# Patient Record
Sex: FEMALE | Race: WHITE | NOT HISPANIC OR LATINO | Employment: OTHER | URBAN - METROPOLITAN AREA
[De-identification: names, ages, dates, MRNs, and addresses within clinical notes are randomized per-mention and may not be internally consistent; named-entity substitution may affect disease eponyms.]

---

## 2017-08-07 ENCOUNTER — OFFICE VISIT (OUTPATIENT)
Dept: PEDIATRICS | Facility: CLINIC | Age: 67
End: 2017-08-07
Payer: COMMERCIAL

## 2017-08-07 ENCOUNTER — TELEPHONE (OUTPATIENT)
Dept: PEDIATRICS | Facility: CLINIC | Age: 67
End: 2017-08-07

## 2017-08-07 VITALS
BODY MASS INDEX: 51.91 KG/M2 | WEIGHT: 293 LBS | HEART RATE: 87 BPM | DIASTOLIC BLOOD PRESSURE: 84 MMHG | TEMPERATURE: 98.7 F | SYSTOLIC BLOOD PRESSURE: 136 MMHG | HEIGHT: 63 IN

## 2017-08-07 DIAGNOSIS — Z12.11 SCREEN FOR COLON CANCER: ICD-10-CM

## 2017-08-07 DIAGNOSIS — I10 ESSENTIAL HYPERTENSION, BENIGN: Primary | ICD-10-CM

## 2017-08-07 DIAGNOSIS — R60.0 LOCALIZED EDEMA: ICD-10-CM

## 2017-08-07 DIAGNOSIS — R01.1 SYSTOLIC MURMUR: ICD-10-CM

## 2017-08-07 DIAGNOSIS — Z23 NEED FOR PROPHYLACTIC VACCINATION WITH TETANUS-DIPHTHERIA (TD): ICD-10-CM

## 2017-08-07 DIAGNOSIS — Z12.31 VISIT FOR SCREENING MAMMOGRAM: ICD-10-CM

## 2017-08-07 PROCEDURE — 99214 OFFICE O/P EST MOD 30 MIN: CPT | Performed by: INTERNAL MEDICINE

## 2017-08-07 PROCEDURE — 90714 TD VACC NO PRESV 7 YRS+ IM: CPT | Performed by: INTERNAL MEDICINE

## 2017-08-07 RX ORDER — HYDROCHLOROTHIAZIDE 25 MG/1
25 TABLET ORAL DAILY
Qty: 90 TABLET | Refills: 3 | Status: SHIPPED | OUTPATIENT
Start: 2017-08-07 | End: 2018-08-17

## 2017-08-07 RX ORDER — LISINOPRIL 10 MG/1
10 TABLET ORAL DAILY
Qty: 90 TABLET | Refills: 3 | Status: SHIPPED | OUTPATIENT
Start: 2017-08-07 | End: 2018-08-17

## 2017-08-07 NOTE — NURSING NOTE
"Chief Complaint   Patient presents with     Recheck Medication       Initial BP (!) 142/92 (Cuff Size: Adult Large)  Pulse 87  Temp 98.7  F (37.1  C) (Oral)  Ht 5' 2.5\" (1.588 m)  Wt (!) 324 lb (147 kg)  BMI 58.32 kg/m2 Estimated body mass index is 58.32 kg/(m^2) as calculated from the following:    Height as of this encounter: 5' 2.5\" (1.588 m).    Weight as of this encounter: 324 lb (147 kg).  Medication Reconciliation: complete    Marquita Foss   "

## 2017-08-07 NOTE — TELEPHONE ENCOUNTER
Panel Management Review      Patient has the following on her problem list:     Hypertension   Last three blood pressure readings:  BP Readings from Last 3 Encounters:   08/07/17 (!) 142/92   09/13/16 132/70   08/21/15 126/80     Blood pressure: FAILED    HTN Guidelines:  Age 18-59 BP range:  Less than 140/90  Age 60-85 with Diabetes:  Less than 140/90  Age 60-85 without Diabetes:  less than 150/90        Composite cancer screening  Chart review shows that this patient is due/due soon for the following Mammogram and Colonoscopy  Summary:    Patient is due/failing the following:   COLONOSCOPY and MAMMOGRAM    Action needed:   Pt needs to schedule mammo and colonoscopy. Orders in.     Type of outreach:    Spoke with pt at 8/7 appt. Pt will call to schedule both mammo and colonoscopy.     Questions for provider review:    None                                                                                                                                    PANKAJ Reyes August 7, 2017 9:26 AM        Chart routed to Closed .

## 2017-08-07 NOTE — PROGRESS NOTES
"  SUBJECTIVE:                                                    Lilliana Manning is a 66 year old female who presents to clinic today for the following health issues:      Followup of HTN.     Taking Medication as prescribed: NO-Has not taking hydrochlorothiazide or lisinopril since Friday 8/4    Side Effects:  None    Medication Helping Symptoms:  yes     She stopped taking her HTN medications as she wondered if she could stop taking them.  No cardiac sx such as CP, palpitations, PND, orthopnea, NGO or change in her chronic peripheral edema.   Was having some fatigue during the daytime, no significant change since stopping the medications.    BP is high normal today.   Advised restarting meds.    Has a systolic murmur.  Echocardiogram was done last September.  Has mild aortic sclerosis, no other significant abnormalities noted.    Reviewed HM.   Due for several preventative care items.  Colonoscopy - referral placed  Mammogram - order placed  Tetanus booster - given during OV    Problem list and histories reviewed & adjusted, as indicated.    Labs reviewed in EPIC    Reviewed and updated as needed this visit by clinical staffTobacco  Allergies  Meds  Med Hx  Surg Hx  Fam Hx  Soc Hx      Reviewed and updated as needed this visit by Provider  Tobacco  Allergies  Meds  Problems  Med Hx  Surg Hx  Fam Hx  Soc Hx          ROS:  Constitutional, HEENT, cardiovascular, pulmonary, gi and gu systems are negative, except as otherwise noted.      OBJECTIVE:   /84  Pulse 87  Temp 98.7  F (37.1  C) (Oral)  Ht 5' 2.5\" (1.588 m)  Wt (!) 324 lb (147 kg)  BMI 58.32 kg/m2  Body mass index is 58.32 kg/(m^2).  GEN: No distress  SKIN: No rashes  HEENT: PERRL. EOMI. No nasal d/c. OP moist.  NECK: Supple. No LAD or TM. No bruits.  LUNGS: Clear to auscultation bilaterally. No rhonchi, rales, wheezes or retractions.  CV: Regular rate and rhythm.  1/6 systolic murmur. No rubs or gallops. Pulses 2+ radial.  ABD: BS+. " S, ND.  EXTR: No significant LE edema.  PSYCH: Normal affect. Well groomed. Good eye contact.     ASSESSMENT/PLAN:       ICD-10-CM    1. BENIGN HYPERTENSION I10 lisinopril (PRINIVIL/ZESTRIL) 10 MG tablet     hydrochlorothiazide (HYDRODIURIL) 25 MG tablet   2. Localized edema R60.0 hydrochlorothiazide (HYDRODIURIL) 25 MG tablet   3. Systolic murmur R01.1    4. Screen for colon cancer Z12.11 GASTROENTEROLOGY ADULT REF PROCEDURE ONLY   5. Visit for screening mammogram Z12.31 MA SCREENING DIGITAL BILAT - Future  (s+30)   6. Need for prophylactic vaccination with tetanus-diphtheria (TD) Z23 TD PRESERV FREE >=7 YRS ADS IM     Continue w/ current medications (take daily).  Will work on dietary changes and weight loss to help bring BP lower into the normal range.  Monitor cardiac status and BP at home if possible.    Screenings ordered as above      Hugh Leon MD  [facility]

## 2017-08-07 NOTE — MR AVS SNAPSHOT
After Visit Summary   8/7/2017    Lilliana Manning    MRN: [id number]           Patient Information     Date Of Birth          1950        Visit Information        Provider Department      8/7/2017 9:20 AM Hugh Leon MD [facility]        Today's Diagnoses     BENIGN HYPERTENSION    -  1    Localized edema        Systolic murmur        Screen for colon cancer        Visit for screening mammogram        Need for prophylactic vaccination with tetanus-diphtheria (TD)           Follow-ups after your visit        Additional Services     GASTROENTEROLOGY ADULT REF PROCEDURE ONLY       Last Lab Result: Creatinine (mg/dL)       Date                     Value                 09/13/2016               0.84             ----------  Body mass index is 58.32 kg/(m^2).     Needed:  No  Language:  English    Patient will be contacted to schedule procedure.     Please be aware that coverage of these services is subject to the terms and limitations of your health insurance plan.  Call member services at your health plan with any benefit or coverage questions.  Any procedures must be performed at a [facility] facility OR coordinated by your clinic's referral office.    Please bring the following with you to your appointment:    (1) Any X-Rays, CTs or MRIs which have been performed.  Contact the facility where they were done to arrange for  prior to your scheduled appointment.    (2) List of current medications   (3) This referral request   (4) Any documents/labs given to you for this referral                  Future tests that were ordered for you today     Open Future Orders        Priority Expected Expires Ordered    MA SCREENING DIGITAL BILAT - Future  (s+30) Routine  8/7/2018 8/7/2017            Who to contact     If you have questions or need follow up information about today's clinic visit or your schedule please contact [facility] [place] directly at [phone].  Normal or  "non-critical lab and imaging results will be communicated to you by MyChart, letter or phone within 4 business days after the clinic has received the results. If you do not hear from us within 7 days, please contact the clinic through [employer] or phone. If you have a critical or abnormal lab result, we will notify you by phone as soon as possible.  Submit refill requests through [employer] or call your pharmacy and they will forward the refill request to us. Please allow 3 business days for your refill to be completed.          Additional Information About Your Visit        [employer] Information     [employer] gives you secure access to your electronic health record. If you see a primary care provider, you can also send messages to your care team and make appointments. If you have questions, please call your primary care clinic.  If you do not have a primary care provider, please call [phone] and they will assist you.        Care EveryWhere ID     This is your Care EveryWhere ID. This could be used by other organizations to access your [facility] medical records  [id number]        Your Vitals Were     Pulse Temperature Height BMI (Body Mass Index)          87 98.7  F (37.1  C) (Oral) 5' 2.5\" (1.588 m) 58.32 kg/m2         Blood Pressure from Last 3 Encounters:   08/07/17 136/84   09/13/16 132/70   08/21/15 126/80    Weight from Last 3 Encounters:   08/07/17 (!) 324 lb (147 kg)   09/13/16 (!) 304 lb (137.9 kg)   08/21/15 283 lb (128.4 kg)              We Performed the Following     GASTROENTEROLOGY ADULT REF PROCEDURE ONLY     TD PRESERV FREE >=7 YRS ADS IM          Where to get your medicines      These medications were sent to [facility]/pharmacy #[id number] - [place], [place] - [address] AT [facility] [place] [address], [place] [address]     Phone:  [phone]     hydrochlorothiazide 25 MG tablet    lisinopril 10 MG tablet          Primary Care Provider Office Phone # Fax #    Hguh Leon MD " [phone]       [facility] [address] [place] [address]        Equal Access to Services     MARIAMA WILSON : Hadii aad ku hadcaspermonroe Padmini, wascottda luqdaniel, qanajmata kaalmada swetha, eyad martinjoyce grace. So [facility] [phone].    ATENCIÓN: Si habla español, tiene a [place] disposición servicios gratuitos de asistencia lingüística. Llame al [phone].    We comply with applicable federal civil rights laws and [place] laws. We do not discriminate on the basis of race, color, national origin, age, disability sex, sexual orientation or gender identity.            Thank you!     Thank you [facility]  [facility] your care. Our goal is always to provide you with excellent care. Hearing back from our patients is one way we can continue to improve our services. Please take a few minutes to complete the written survey that you may receive in the mail after your visit with us. Thank you!             Your Updated Medication List - Protect others around you: Learn how to safely use, store and throw away your medicines at www.disposemymeds.org.          This list is accurate as of: 8/7/17  3:15 PM.  Always use your most recent med list.                   Brand Name Dispense Instructions for use Diagnosis    calcium-vitamin D 600-400 MG-UNIT per tablet    CALTRATE     Take 1 tablet by mouth 2 times daily        hydrochlorothiazide 25 MG tablet    HYDRODIURIL    90 tablet    Take 1 tablet (25 mg) by mouth daily    Essential hypertension, benign, Localized edema       lisinopril 10 MG tablet    PRINIVIL/ZESTRIL    90 tablet    Take 1 tablet (10 mg) by mouth daily    Essential hypertension, benign       Multi-vitamin Tabs tablet     100 tablet    Take 1 tablet by mouth daily

## 2017-08-16 ENCOUNTER — HOSPITAL ENCOUNTER (OUTPATIENT)
Facility: CLINIC | Age: 67
End: 2017-08-16
Attending: INTERNAL MEDICINE | Admitting: INTERNAL MEDICINE
Payer: MEDICARE

## 2017-09-15 ENCOUNTER — RADIANT APPOINTMENT (OUTPATIENT)
Dept: MAMMOGRAPHY | Facility: CLINIC | Age: 67
End: 2017-09-15
Attending: INTERNAL MEDICINE
Payer: COMMERCIAL

## 2017-09-15 DIAGNOSIS — Z12.31 VISIT FOR SCREENING MAMMOGRAM: ICD-10-CM

## 2017-09-15 PROCEDURE — G0202 SCR MAMMO BI INCL CAD: HCPCS | Mod: TC

## 2017-10-28 ENCOUNTER — HEALTH MAINTENANCE LETTER (OUTPATIENT)
Age: 67
End: 2017-10-28

## 2017-12-22 ENCOUNTER — TRANSFERRED RECORDS (OUTPATIENT)
Dept: HEALTH INFORMATION MANAGEMENT | Facility: CLINIC | Age: 67
End: 2017-12-22

## 2018-01-10 ENCOUNTER — TELEPHONE (OUTPATIENT)
Dept: PEDIATRICS | Facility: CLINIC | Age: 68
End: 2018-01-10

## 2018-01-10 NOTE — TELEPHONE ENCOUNTER
"1/10/2018      Patient Communication Preferences indicate  Do not contact  and/or communication by \"Phone\" is not preferred. Call not required per Outreach team.          Outreach ,  Debbie Ly    "

## 2018-08-17 DIAGNOSIS — R60.0 LOCALIZED EDEMA: ICD-10-CM

## 2018-08-17 DIAGNOSIS — I10 ESSENTIAL HYPERTENSION, BENIGN: ICD-10-CM

## 2018-08-17 NOTE — TELEPHONE ENCOUNTER
"Requested Prescriptions   Pending Prescriptions Disp Refills     lisinopril (PRINIVIL/ZESTRIL) 10 MG tablet [Pharmacy Med Name: LISINOPRIL 10 MG TABLET]    Last Written Prescription Date:  8/7/2017  Last Fill Quantity: 90,  # refills: 3   Last office visit: 8/7/2017 with prescribing provider:  Hugh Leon     Future Office Visit:     90 tablet 3     Sig: TAKE 1 TABLET (10 MG) BY MOUTH DAILY    ACE Inhibitors (Including Combos) Protocol Failed    8/17/2018  1:10 AM       Failed - Blood pressure under 140/90 in past 12 months    BP Readings from Last 3 Encounters:   08/07/17 136/84   09/13/16 132/70   08/21/15 126/80                Failed - Recent (12 mo) or future (30 days) visit within the authorizing provider's specialty    Patient had office visit in the last 12 months or has a visit in the next 30 days with authorizing provider or within the authorizing provider's specialty.  See \"Patient Info\" tab in inbasket, or \"Choose Columns\" in Meds & Orders section of the refill encounter.           Failed - Normal serum creatinine on file in past 12 months    Recent Labs   Lab Test  09/13/16   1039   CR  0.84            Failed - Normal serum potassium on file in past 12 months    Recent Labs   Lab Test  09/13/16   1039   POTASSIUM  4.1            Passed - Patient is age 18 or older       Passed - No active pregnancy on record       Passed - No positive pregnancy test in past 12 months        hydrochlorothiazide (HYDRODIURIL) 25 MG tablet [Pharmacy Med Name: HYDROCHLOROTHIAZIDE 25 MG TAB]    Last Written Prescription Date:  8/7/2017  Last Fill Quantity: 90,  # refills: 3   Last office visit: 8/7/2017 with prescribing provider:  Hugh Leon     Future Office Visit:     90 tablet 3     Sig: TAKE 1 TABLET (25 MG) BY MOUTH DAILY    Diuretics (Including Combos) Protocol Failed    8/17/2018  1:10 AM       Failed - Blood pressure under 140/90 in past 12 months    BP Readings from Last 3 Encounters:   08/07/17 136/84 " "  09/13/16 132/70   08/21/15 126/80                Failed - Recent (12 mo) or future (30 days) visit within the authorizing provider's specialty    Patient had office visit in the last 12 months or has a visit in the next 30 days with authorizing provider or within the authorizing provider's specialty.  See \"Patient Info\" tab in inbasket, or \"Choose Columns\" in Meds & Orders section of the refill encounter.           Failed - Normal serum creatinine on file in past 12 months    Recent Labs   Lab Test  09/13/16   1039   CR  0.84             Failed - Normal serum potassium on file in past 12 months    Recent Labs   Lab Test  09/13/16   1039   POTASSIUM  4.1                   Failed - Normal serum sodium on file in past 12 months    Recent Labs   Lab Test  09/13/16   1039   NA  141             Passed - Patient is age 18 or older       Passed - No active pregancy on record       Passed - No positive pregnancy test in past 12 months          "

## 2018-08-20 RX ORDER — LISINOPRIL 10 MG/1
TABLET ORAL
Qty: 90 TABLET | Refills: 0 | Status: SHIPPED | OUTPATIENT
Start: 2018-08-20 | End: 2018-11-07

## 2018-08-20 RX ORDER — HYDROCHLOROTHIAZIDE 25 MG/1
TABLET ORAL
Qty: 90 TABLET | Refills: 0 | Status: SHIPPED | OUTPATIENT
Start: 2018-08-20 | End: 2018-11-20 | Stop reason: DRUGHIGH

## 2018-11-07 ENCOUNTER — OFFICE VISIT (OUTPATIENT)
Dept: PEDIATRICS | Facility: CLINIC | Age: 68
End: 2018-11-07
Payer: COMMERCIAL

## 2018-11-07 VITALS
TEMPERATURE: 97.7 F | OXYGEN SATURATION: 96 % | DIASTOLIC BLOOD PRESSURE: 70 MMHG | HEART RATE: 101 BPM | WEIGHT: 293 LBS | BODY MASS INDEX: 53.92 KG/M2 | HEIGHT: 62 IN | SYSTOLIC BLOOD PRESSURE: 132 MMHG

## 2018-11-07 DIAGNOSIS — Z12.39 SCREENING FOR BREAST CANCER: ICD-10-CM

## 2018-11-07 DIAGNOSIS — R60.0 LOCALIZED EDEMA: ICD-10-CM

## 2018-11-07 DIAGNOSIS — L30.9 DERMATITIS: ICD-10-CM

## 2018-11-07 DIAGNOSIS — I10 ESSENTIAL HYPERTENSION, BENIGN: Primary | ICD-10-CM

## 2018-11-07 PROCEDURE — 99213 OFFICE O/P EST LOW 20 MIN: CPT | Performed by: INTERNAL MEDICINE

## 2018-11-07 RX ORDER — LISINOPRIL 10 MG/1
TABLET ORAL
Qty: 90 TABLET | Refills: 4 | Status: SHIPPED | OUTPATIENT
Start: 2018-11-07 | End: 2019-11-11

## 2018-11-07 RX ORDER — TRIAMCINOLONE ACETONIDE 1 MG/G
CREAM TOPICAL
Qty: 30 G | Refills: 0 | Status: SHIPPED | OUTPATIENT
Start: 2018-11-07 | End: 2018-11-28

## 2018-11-07 RX ORDER — HYDROCHLOROTHIAZIDE 25 MG/1
TABLET ORAL
Qty: 90 TABLET | Refills: 0 | Status: CANCELLED | OUTPATIENT
Start: 2018-11-07

## 2018-11-07 RX ORDER — HYDROCHLOROTHIAZIDE 50 MG/1
50 TABLET ORAL DAILY
Qty: 90 TABLET | Refills: 4 | Status: SHIPPED | OUTPATIENT
Start: 2018-11-07 | End: 2020-01-06

## 2018-11-07 NOTE — PATIENT INSTRUCTIONS
Increase Hydrochlorothiazide to 50 mg daily    Triamcinolone 0.1% cream to the 2 spots on your right arm twice per day for 2 weeks    Schedule a dermatology visit in case the spots don't improve

## 2018-11-07 NOTE — PROGRESS NOTES
"  SUBJECTIVE:   Lilliana Manning is a 68 year old female who presents to clinic today for the following health issues:    Hypertension Follow-up      Outpatient blood pressures are not being checked.    Low Salt Diet: no added salt    No cardiac sx such as CP, palpitations, PND, orthopnea, NGO.    Has chronic peripheral edema. Seems somewhat worse in the past 2 months, but has not bee as active.  Questions possibly increasing HCTZ dose.    2 skin lesions on the right forearm. Clear. One will sometimes flake off.      Amount of exercise or physical activity: none     Problems taking medications regularly: No    Medication side effects: none    Diet: regular (no restrictions), low salt and low fat/cholesterol    Problem list and histories reviewed & adjusted, as indicated.    Labs reviewed in EPIC        OBJECTIVE:     /70 (BP Location: Right arm, Patient Position: Chair, Cuff Size: Adult Large)  Pulse 101  Temp 97.7  F (36.5  C) (Tympanic)  Ht 5' 2.25\" (1.581 m)  Wt (!) 338 lb 9 oz (153.6 kg)  SpO2 96%  BMI 61.43 kg/m2  Body mass index is 61.43 kg/(m^2).  GEN: No distress  SKIN: 2 clear nevi on the right proximal forearm, larger approx 1 cm diameter, other 5-6 mm diameter. Slightly raised.   NECK: Supple. No LAD or TM.  LUNGS: Clear to auscultation bilaterally. No rhonchi, rales, wheezes or retractions.  CV: RRR. 1/6 LEVI loudest at the RUSB. Pulses 2+ vic radial.     ASSESSMENT/PLAN:       ICD-10-CM    1. BENIGN HYPERTENSION I10 lisinopril (PRINIVIL/ZESTRIL) 10 MG tablet     hydrochlorothiazide (HYDRODIURIL) 50 MG tablet   2. Localized edema R60.0 hydrochlorothiazide (HYDRODIURIL) 50 MG tablet   3. Screening for breast cancer Z12.31 *MA Screening Digital Bilateral   4. Dermatitis L30.9 triamcinolone (KENALOG) 0.1 % cream     DERMATOLOGY REFERRAL     HTN - controlled  Edema - somewhat worse. Increase HCTZ to 50 mg every day. Restart exercise regimen.  Derm changes - likely SK. Cannot r/o local irritation. " Trial of triamcinolone. Schedule derm appt in case does not improve.    Hugh Leon MD  [facility]

## 2018-11-07 NOTE — MR AVS SNAPSHOT
After Visit Summary   11/7/2018    Lilliana Manning    MRN: [id number]           Patient Information     Date Of Birth          1950        Visit Information        Provider Department      11/7/2018 10:00 AM Hugh Leon MD [facility]        Today's Diagnoses     BENIGN HYPERTENSION    -  1    Localized edema        Screening for breast cancer        Dermatitis          Care Instructions    Increase Hydrochlorothiazide to 50 mg daily    Triamcinolone 0.1% cream to the 2 spots on your right arm twice per day for 2 weeks    Schedule a dermatology visit in case the spots don't improve           Follow-ups after your visit        Additional Services     DERMATOLOGY REFERRAL       Your provider has referred you to:   [facility] [place] [phone]    [facility] [place] [phone]   [email or web address]    Please be aware that coverage of these services is subject to the terms and limitations of your health insurance plan.  Call member services at your health plan with any benefit or coverage questions.      Please bring the following with you to your appointment:    (1) Any X-Rays, CTs or MRIs which have been performed.  Contact the facility where they were done to arrange for  prior to your scheduled appointment.    (2) List of current medications  (3) This referral request   (4) Any documents/labs given to you for this referral                  Your next 10 appointments already scheduled     Nov 28, 2018  9:20 AM CST   PHYSICAL with Hugh Leon MD   [facility] ([facility]3])    [address] [place] [address]  [address]  [facility] [address] [phone]            Nov 28, 2018 10:15 AM CST   MA SCREENING DIGITAL BILATERAL with EAMA1   [facility] ([facility]3])    [address] [place] [address],[address]  [facility] [address] [phone]           How do I prepare for my  "exam? (Food and drink instructions) No Food and Drink Restrictions.  How do I prepare for my exam? (Other instructions) Do not use any powder, lotion or deodorant under your arms or on your breast. If you do, we will ask you to remove it before your exam.  What should I wear: Wear comfortable, two-piece clothing.  How long does the exam take: Most scans will take 15 minutes.  What should I bring: Bring any previous mammograms from other facilities or have them mailed to the breast center.  Do I need a :  No  is needed.  What do I need to tell my doctor: If you have any allergies, tell your care team.  What should I do after the exam: No restrictions, You may resume normal activities.  What is this test: This test is an x-ray of the breast to look for breast disease. The breast is pressed between two plates to flatten and spread the tissue. An X-ray is taken of the breast from different angles.  Who should I call with questions: If you have any questions, please call the Imaging Department where you will have your exam. Directions, parking instructions, and other information is available on our website, [email or web address].  Other information about my exam Three-dimensional (3D) mammograms are available at [place] locations in [facility], [facility], [facility], [place], [facility] and [place]. [facility] locations include [place] and the [facility] in [place].  Benefits of 3D mammograms include * Improved rate of cancer detection * Decreases your chance of having to go back for more tests, which means fewer: * \"False-positive\" results (This means that there is an abnormal area but it isn't cancer.) * Invasive testing procedures, such as a biopsy or surgery * Can provide clearer images of the breast if you have dense breast tissue.  *3D mammography is an optional exam that anyone can have with a 2D mammogram. It doesn't replace or take the place of a 2D " "mammogram. 2D mammograms remain an effective screening test for all women.  Not all insurance companies cover the cost of a 3D mammogram. Check with your insurance. Three-dimensional (3D) mammograms are available at [place] locations in [facility], [facility], and [place]. [facility] locations include [place] and [facility] in [place]. Benefits of 3D mammograms include: - Improved rate of cancer detection - Decreases your chance of having to go back for more tests, which means fewer: - \"False-positive\" results (This means that there is an abnormal area but it isn't cancer.) - Invasive testing procedures, such as a biopsy or surgery - Can provide clearer images of the breast if you have dense breast tissue. 3D mammography is an optional exam that anyone can have with a 2D mammogram. It doesn't replace or take the place of a 2D mammogram. 2D mammograms remain an effective screening test for all women.  Not all insurance companies cover the cost of a 3D mammogram. Check with your insurance.              Future tests that were ordered for you today     Open Future Orders        Priority Expected Expires Ordered    *MA Screening Digital Bilateral Routine  11/7/2019 11/7/2018            Who to contact     If you have questions or need follow up information about today's clinic visit or your schedule please contact [facility] directly at [phone].  Normal or non-critical lab and imaging results will be communicated to you by MyChart, letter or phone within 4 business days after the clinic has received the results. If you do not hear from us within 7 days, please contact the clinic through Paytellerhart or phone. If you have a critical or abnormal lab result, we will notify you by phone as soon as possible.  Submit refill requests through ShoutOmatic or call your pharmacy and they will forward the refill request to us. Please allow 3 business days " "for your refill to be completed.          Additional Information About Your Visit        Vistaarhart Information     H[employer] gives you secure access to your electronic health record. If you see a primary care provider, you can also send messages to your care team and make appointments. If you have questions, please call your primary care clinic.  If you do not have a primary care provider, please call [phone] and they will assist you.        Care EveryWhere ID     This is your Care EveryWhere ID. This could be used by other organizations to access your [place] medical records  [id number]        Your Vitals Were     Pulse Temperature Height Pulse Oximetry BMI (Body Mass Index)       101 97.7  F (36.5  C) (Tympanic) 5' 2.25\" (1.581 m) 96% 61.43 kg/m2        Blood Pressure from Last 3 Encounters:   11/07/18 132/70   08/07/17 136/84   09/13/16 132/70    Weight from Last 3 Encounters:   11/07/18 (!) 338 lb 9 oz (153.6 kg)   08/07/17 (!) 324 lb (147 kg)   09/13/16 (!) 304 lb (137.9 kg)              We Performed the Following     DERMATOLOGY REFERRAL          Today's Medication Changes          These changes are accurate as of 11/7/18 10:29 AM.  If you have any questions, ask your nurse or doctor.               Start taking these medicines.        Dose/Directions    triamcinolone 0.1 % cream   Commonly known as:  KENALOG   Used for:  Dermatitis   Started by:  Hugh Leon MD        Apply sparingly to affected area two times daily for 14 days.   Quantity:  30 g   Refills:  0         These medicines have changed or have updated prescriptions.        Dose/Directions    * hydrochlorothiazide 25 MG tablet   Commonly known as:  HYDRODIURIL   This may have changed:  Another medication with the same name was added. Make sure you understand how and when to take each.   Used for:  Essential hypertension, benign, Localized edema   Changed by:  Hugh Leon MD        TAKE 1 TABLET (25 MG) BY MOUTH DAILY   Quantity:  90 " tablet   Refills:  0       * hydrochlorothiazide 50 MG tablet   Commonly known as:  HYDRODIURIL   This may have changed:  You were already taking a medication with the same name, and this prescription was added. Make sure you understand how and when to take each.   Used for:  Localized edema, Essential hypertension, benign   Changed by:  Hugh Leon MD        Dose:  50 mg   Take 1 tablet (50 mg) by mouth daily   Quantity:  90 tablet   Refills:  4       * Notice:  This list has 2 medication(s) that are the same as other medications prescribed for you. Read the directions carefully, and ask your doctor or other care provider to review them with you.         Where to get your medicines      These medications were sent to [facility]/pharmacy #[id number] - [place], [place] - [address] [place] [address] [facility] [address], [place] [address]     Phone:  [phone]     hydrochlorothiazide 50 MG tablet    lisinopril 10 MG tablet    triamcinolone 0.1 % cream                Primary Care Provider Office Phone # Fax #    Hugh Leon [phone]       [address] [place] [address] [address] [place] [address]        Equal Access to Services     [facility] AH: Hadii suzie watson hadcaspero Someghana, waaxda luqadaha, qaybta kaalmada swetha, eyad watkins . So [facility] [phone].    ATENCIÓN: Si habla español, tiene a wakefeild disposición servicios gratuitos de asistencia lingüística. [facility] [phone].    We comply with applicable federal civil rights laws and [place] laws. We do not discriminate on the basis of race, color, national origin, age, disability, sex, sexual orientation, or gender identity.            Thank you!     Thank you for choosing [facility]  for your care. Our goal is always to provide you with excellent care. Hearing back from our patients is one way we can continue to improve our services. Please take a few minutes to complete the written survey that you  may receive in the mail after your visit with us. Thank you!             Your Updated Medication List - Protect others around you: Learn how to safely use, store and throw away your medicines at www.disposemymeds.org.          This list is accurate as of 11/7/18 10:29 AM.  Always use your most recent med list.                   Brand Name Dispense Instructions for use Diagnosis    calcium carbonate 600 mg-vitamin D 400 units 600-400 MG-UNIT per tablet    CALTRATE     Take 1 tablet by mouth 2 times daily        * hydrochlorothiazide 25 MG tablet    HYDRODIURIL    90 tablet    TAKE 1 TABLET (25 MG) BY MOUTH DAILY    Essential hypertension, benign, Localized edema       * hydrochlorothiazide 50 MG tablet    HYDRODIURIL    90 tablet    Take 1 tablet (50 mg) by mouth daily    Localized edema, Essential hypertension, benign       lisinopril 10 MG tablet    PRINIVIL/ZESTRIL    90 tablet    TAKE 1 TABLET (10 MG) BY MOUTH DAILY    Essential hypertension, benign       Multi-vitamin Tabs tablet     100 tablet    Take 1 tablet by mouth daily        triamcinolone 0.1 % cream    KENALOG    30 g    Apply sparingly to affected area two times daily for 14 days.    Dermatitis       * Notice:  This list has 2 medication(s) that are the same as other medications prescribed for you. Read the directions carefully, and ask your doctor or other care provider to review them with you.

## 2018-11-17 ENCOUNTER — HEALTH MAINTENANCE LETTER (OUTPATIENT)
Age: 68
End: 2018-11-17

## 2018-11-20 DIAGNOSIS — I10 ESSENTIAL HYPERTENSION, BENIGN: ICD-10-CM

## 2018-11-20 DIAGNOSIS — R60.0 LOCALIZED EDEMA: ICD-10-CM

## 2018-11-20 RX ORDER — HYDROCHLOROTHIAZIDE 25 MG/1
TABLET ORAL
Qty: 90 TABLET | Refills: 0 | OUTPATIENT
Start: 2018-11-20

## 2018-11-20 NOTE — TELEPHONE ENCOUNTER
Not due for a refill.     hydrochlorothiazide (HYDRODIURIL) 50 MG tablet was filled on 11/7/2018, qty 90 with 4 refills.

## 2018-11-25 ASSESSMENT — ENCOUNTER SYMPTOMS
FEVER: 0
FREQUENCY: 0
COUGH: 0
HEADACHES: 0
HEMATURIA: 0
DIZZINESS: 0
DIARRHEA: 0
CONSTIPATION: 0
JOINT SWELLING: 0
NERVOUS/ANXIOUS: 0
PALPITATIONS: 0
SHORTNESS OF BREATH: 0
HEARTBURN: 0
SORE THROAT: 0
CHILLS: 0
WEAKNESS: 0
HEMATOCHEZIA: 0
BREAST MASS: 0
ABDOMINAL PAIN: 0
PARESTHESIAS: 0
ARTHRALGIAS: 0
NAUSEA: 0
DYSURIA: 0
MYALGIAS: 0
EYE PAIN: 0

## 2018-11-25 ASSESSMENT — ACTIVITIES OF DAILY LIVING (ADL): CURRENT_FUNCTION: NO ASSISTANCE NEEDED

## 2018-11-28 ENCOUNTER — RADIANT APPOINTMENT (OUTPATIENT)
Dept: MAMMOGRAPHY | Facility: CLINIC | Age: 68
End: 2018-11-28
Payer: COMMERCIAL

## 2018-11-28 ENCOUNTER — OFFICE VISIT (OUTPATIENT)
Dept: PEDIATRICS | Facility: CLINIC | Age: 68
End: 2018-11-28
Payer: COMMERCIAL

## 2018-11-28 VITALS
HEART RATE: 90 BPM | SYSTOLIC BLOOD PRESSURE: 122 MMHG | OXYGEN SATURATION: 95 % | DIASTOLIC BLOOD PRESSURE: 72 MMHG | HEIGHT: 62 IN | BODY MASS INDEX: 53.92 KG/M2 | TEMPERATURE: 98.2 F | WEIGHT: 293 LBS

## 2018-11-28 DIAGNOSIS — Z00.00 ROUTINE GENERAL MEDICAL EXAMINATION AT A HEALTH CARE FACILITY: Primary | ICD-10-CM

## 2018-11-28 DIAGNOSIS — Z12.39 SCREENING FOR BREAST CANCER: ICD-10-CM

## 2018-11-28 DIAGNOSIS — I10 ESSENTIAL HYPERTENSION, BENIGN: ICD-10-CM

## 2018-11-28 LAB
ANION GAP SERPL CALCULATED.3IONS-SCNC: 10 MMOL/L (ref 3–14)
BUN SERPL-MCNC: 24 MG/DL (ref 7–30)
CALCIUM SERPL-MCNC: 9.7 MG/DL (ref 8.5–10.1)
CHLORIDE SERPL-SCNC: 103 MMOL/L (ref 94–109)
CHOLEST SERPL-MCNC: 188 MG/DL
CO2 SERPL-SCNC: 26 MMOL/L (ref 20–32)
CREAT SERPL-MCNC: 1.08 MG/DL (ref 0.52–1.04)
GFR SERPL CREATININE-BSD FRML MDRD: 51 ML/MIN/1.7M2
GLUCOSE SERPL-MCNC: 103 MG/DL (ref 70–99)
HDLC SERPL-MCNC: 75 MG/DL
LDLC SERPL CALC-MCNC: 92 MG/DL
NONHDLC SERPL-MCNC: 113 MG/DL
POTASSIUM SERPL-SCNC: 4 MMOL/L (ref 3.4–5.3)
SODIUM SERPL-SCNC: 139 MMOL/L (ref 133–144)
TRIGL SERPL-MCNC: 107 MG/DL

## 2018-11-28 PROCEDURE — 80048 BASIC METABOLIC PNL TOTAL CA: CPT | Performed by: INTERNAL MEDICINE

## 2018-11-28 PROCEDURE — 77067 SCR MAMMO BI INCL CAD: CPT | Mod: TC

## 2018-11-28 PROCEDURE — 36415 COLL VENOUS BLD VENIPUNCTURE: CPT | Performed by: INTERNAL MEDICINE

## 2018-11-28 PROCEDURE — 80061 LIPID PANEL: CPT | Performed by: INTERNAL MEDICINE

## 2018-11-28 PROCEDURE — 99397 PER PM REEVAL EST PAT 65+ YR: CPT | Performed by: INTERNAL MEDICINE

## 2018-11-28 ASSESSMENT — ENCOUNTER SYMPTOMS
BREAST MASS: 0
PARESTHESIAS: 0
ARTHRALGIAS: 0
DYSURIA: 0
FEVER: 0
FREQUENCY: 0
HEADACHES: 0
WEAKNESS: 0
COUGH: 0
DIARRHEA: 0
HEMATOCHEZIA: 0
PALPITATIONS: 0
EYE PAIN: 0
MYALGIAS: 0
DIZZINESS: 0
HEARTBURN: 0
CHILLS: 0
SORE THROAT: 0
SHORTNESS OF BREATH: 0
NERVOUS/ANXIOUS: 0
HEMATURIA: 0
CONSTIPATION: 0
ABDOMINAL PAIN: 0
NAUSEA: 0
JOINT SWELLING: 0

## 2018-11-28 ASSESSMENT — ACTIVITIES OF DAILY LIVING (ADL): CURRENT_FUNCTION: NO ASSISTANCE NEEDED

## 2018-11-28 NOTE — PROGRESS NOTES
"    SUBJECTIVE:   Lilliana Manning is a 68 year old female who presents for Preventive Visit.    Are you in the first 12 months of your Medicare Part B coverage?  No      Answers for HPI/ROS submitted by the patient on 2018   Annual Exam:  In general, how would you rate your overall physical health?: fair  Frequency of exercise:: None  Do you usually eat at least 4 servings of fruit and vegetables a day, include whole grains & fiber, and avoid regularly eating high fat or \"junk\" foods? : No  Taking medications regularly:: Yes  Medication side effects:: None  Activities of Daily Living: no assistance needed  Home safety: no safety concerns identified  Hearing Impairment:: no hearing concerns  In the past 6 months, have you been bothered by leaking of urine?: No  abdominal pain: No  Blood in stool: No  Blood in urine: No  chest pain: No  chills: No  congestion: No  constipation: No  cough: No  diarrhea: No  dizziness: No  ear pain: No  eye pain: No  nervous/anxious: No  fever: No  frequency: No  genital sores: No  headaches: No  hearing loss: No  heartburn: No  arthralgias: No  joint swelling: No  peripheral edema: Yes  mood changes: No  myalgias: No  nausea: No  dysuria: No  palpitations: No  Skin sensation changes: No  sore throat: No  urgency: No  rash: No  shortness of breath: No  visual disturbance: No  weakness: No  pelvic pain: No  vaginal bleeding: No  vaginal discharge: No  tenderness: No  breast mass: No  breast discharge: No  In general, how would you rate your overall mental or emotional health?: good  Additional concerns today:: No  PHQ-2 Score: 0    Do you feel safe in your environment? Yes    Do you have a Health Care Directive? Yes: Patient states has Advance Directive and will bring in a copy to clinic.    Additional concerns to address?  No    Fall risk:  Fallen 2 or more times in the past year?: No  Any fall with injury in the past year?: No    Cognitive Screenin) Repeat 3 items (Leader, " Season, Table)    2) Clock draw:   3) 3 item recall: Recalls 3 objects  Results: 3 items recalled: COGNITIVE IMPAIRMENT LESS LIKELY    Mini-CogTM Copyright S Lenora. Licensed by the author for use in [facility]; reprinted with permission (josi@[facility]). All rights reserved.      Do you have sleep apnea, excessive snoring or daytime drowsiness?: no    HTN. Was having increased leg edema. Increased HCTZ dose earlier this month. Has helped edema significantly.   BP Readings from Last 3 Encounters:   11/28/18 122/72   11/07/18 132/70   08/07/17 136/84     Obesity. Working on weight loss.  Wt Readings from Last 4 Encounters:   11/28/18 329 lb 3.2 oz (149.3 kg)   11/07/18 (!) 338 lb 9 oz (153.6 kg)   08/07/17 (!) 324 lb (147 kg)   09/13/16 (!) 304 lb (137.9 kg)         Reviewed and updated as needed this visit by clinical staff  Tobacco  Allergies  Meds  Med Hx  Surg Hx  Fam Hx  Soc Hx        Reviewed and updated as needed this visit by Provider  Tobacco  Allergies  Meds  Problems  Med Hx  Surg Hx  Fam Hx  Soc Hx         Social History   Substance Use Topics     Smoking status: Never Smoker     Smokeless tobacco: Never Used     Alcohol use 0.0 oz/week     0 Standard drinks or equivalent per week      Comment: 1 drink weekly                           Current providers sharing in care for this patient include:   Patient Care Team:  Hugh Leon MD as PCP - General    The following health maintenance items are reviewed in Epic and correct as of today:  Health Maintenance   Topic Date Due     ADVANCE DIRECTIVE PLANNING Q5 YRS  01/04/2017     COLONOSCOPY Q10 YR  08/27/2017     FALL RISK ASSESSMENT  08/07/2018     DEXA Q3 YR  08/21/2018     MAMMO Q1 YR  09/15/2018     PHQ-2 Q1 YR  11/28/2019     LIPID MONITORING Q5 YEARS  09/13/2021     TETANUS Q10 YR  08/07/2027     PNEUMOCOCCAL  Completed     INFLUENZA VACCINE  Completed     HEPATITIS C SCREENING  Addressed     Labs reviewed in  "EPIC    ROS:  Constitutional, HEENT, cardiovascular, pulmonary, gi and gu systems are negative, except as otherwise noted.  SKIN: Persistent dermatitis. Has derm appt scheduled.    OBJECTIVE:   /72 (BP Location: Right arm, Patient Position: Chair, Cuff Size: Adult Large)  Pulse 90  Temp 98.2  F (36.8  C) (Oral)  Ht 5' 2.4\" (1.585 m)  Wt 329 lb 3.2 oz (149.3 kg)  SpO2 95%  BMI 59.44 kg/m2 Estimated body mass index is 59.44 kg/(m^2) as calculated from the following:    Height as of this encounter: 5' 2.4\" (1.585 m).    Weight as of this encounter: 329 lb 3.2 oz (149.3 kg).  EXAM:   GENERAL: healthy, alert and no distress  EYES: Eyes grossly normal to inspection, PERRL and conjunctivae and sclerae normal  HENT: ear canals and TM's normal, nose and mouth without ulcers or lesions  NECK: no adenopathy, no asymmetry, masses, or scars and thyroid normal to palpation  RESP: lungs clear to auscultation - no rales, rhonchi or wheezes  BREAST: normal without masses, tenderness or nipple discharge and no palpable axillary masses or adenopathy  CV: regular rate and rhythm, normal S1 S2, no S3 or S4, no murmur, click or rub, no peripheral edema and peripheral pulses strong  ABDOMEN: soft, nontender, no hepatosplenomegaly, no masses and bowel sounds normal  MS: no gross musculoskeletal defects noted, no edema  NEURO: Normal strength and tone, mentation intact and speech normal  PSYCH: mentation appears normal, affect normal/bright      ASSESSMENT / PLAN:       ICD-10-CM    1. Routine general medical examination at a health care facility Z00.00 Lipid panel reflex to direct LDL Fasting     Basic metabolic panel   2. Essential hypertension, benign I10        End of Life Planning:  Patient currently has an advanced directive: Yes.  Practitioner is supportive of decision.    COUNSELING:  Reviewed preventive health counseling, as reflected in patient instructions    BP Readings from Last 1 Encounters:   11/28/18 122/72 " "    Estimated body mass index is 59.44 kg/(m^2) as calculated from the following:    Height as of this encounter: 5' 2.4\" (1.585 m).    Weight as of this encounter: 329 lb 3.2 oz (149.3 kg).      Weight management plan: Discussed healthy diet and exercise guidelines     reports that she has never smoked. She has never used smokeless tobacco.      Appropriate preventive services were discussed with this patient, including applicable screening as appropriate for cardiovascular disease, diabetes, osteopenia/osteoporosis, and glaucoma.  As appropriate for age/gender, discussed screening for colorectal cancer, breast cancer, and cervical cancer. Checklist reviewing preventive services available has been given to the patient.    Reviewed patients plan of care and provided an AVS. The Basic Care Plan (routine screening as documented in Health Maintenance) for Lilliana meets the Care Plan requirement. This Care Plan has been established and reviewed with the Patient.    Counseling Resources:  ATP IV Guidelines  Pooled Cohorts Equation Calculator  Breast Cancer Risk Calculator  FRAX Risk Assessment  ICSI Preventive Guidelines  Dietary Guidelines for Americans, 2010  USDA's MyPlate  ASA Prophylaxis  Lung CA Screening    Hugh Leon MD  [facility] [place]  "

## 2018-11-28 NOTE — PROGRESS NOTES
"SUBJECTIVE:   Lilliana Manning is a 68 year old female who presents for Preventive Visit.  Are you in the first 12 months of your Medicare coverage?  No    Annual Wellness Visit     In general, how would you rate your overall health?  Fair    Frequency of exercise:  None    Do you usually eat at least 4 servings of fruit and vegetables a day, include whole grains    & fiber and avoid regularly eating high fat or \"junk\" foods?  No    Taking medications regularly:  Yes    Medication side effects:  None    Ability to successfully perform activities of daily living:  No assistance needed    Home Safety:  No safety concerns identified    Hearing Impairment:  No hearing concerns    In the past 6 months, have you been bothered by leaking of urine?  No    In general, how would you rate your overall mental or emotional health?  Good    PHQ-2 Total Score: 0    Additional concerns today:  No    Do you feel safe in your environment? Yes    Do you have a Health Care Directive? Yes: Patient states has Advance Directive and will bring in a copy to clinic. She brought with her today.    {Hearing Test Done (Optional):495790}  Fall risk     Cognitive Screening   1) Repeat 3 items (Leader, Season, Table)    2) Clock draw: NORMAL  3) 3 item recall: {Say: \"What were the three words I asked you to remember?\"}Recalls 3 objects  Results: 3 items recalled: COGNITIVE IMPAIRMENT LESS LIKELY    Mini-CogTM Copyright S Lenora. Licensed by the author for use in [facility]; reprinted with permission (josi@.[facility]). All rights reserved.      {Do you have sleep apnea, excessive snoring or daytime drowsiness? (Optional):938266}    Reviewed and updated as needed this visit by clinical staff  Tobacco  Allergies  Meds  Med Hx  Surg Hx  Fam Hx  Soc Hx        Reviewed and updated as needed this visit by Provider        Social History   Substance Use Topics     Smoking status: Never Smoker     Smokeless tobacco: Never Used     Alcohol " use 0.0 oz/week     0 Standard drinks or equivalent per week      Comment: 1 drink weekly       Alcohol Use 11/25/2018   If you drink alcohol do you typically have greater than 3 drinks per day OR greater than 7 drinks per week? No       {additional problems to add (Optional):852869}    Current providers sharing in care for this patient include:   Patient Care Team:  Hugh Leon MD as PCP - General    The following health maintenance items are reviewed in Epic and correct as of today:  Health Maintenance   Topic Date Due     ADVANCE DIRECTIVE PLANNING Q5 YRS  01/04/2017     COLONOSCOPY Q10 YR  08/27/2017     FALL RISK ASSESSMENT  08/07/2018     DEXA Q3 YR  08/21/2018     MAMMO Q1 YR  09/15/2018     PHQ-2 Q1 YR  11/28/2019     LIPID MONITORING Q5 YEARS  09/13/2021     TETANUS Q10 YR  08/07/2027     PNEUMOCOCCAL  Completed     INFLUENZA VACCINE  Completed     HEPATITIS C SCREENING  Addressed     {Chronicprobdata (Optional):978807}  {Decision Support (Optional):826952}    Review of Systems   Constitutional: Negative for chills and fever.   HENT: Negative for congestion, ear pain, hearing loss and sore throat.    Eyes: Negative for pain and visual disturbance.   Respiratory: Negative for cough and shortness of breath.    Cardiovascular: Positive for peripheral edema. Negative for chest pain and palpitations.   Gastrointestinal: Negative for abdominal pain, constipation, diarrhea, heartburn, hematochezia and nausea.   Breasts:  Negative for tenderness, breast mass and discharge.   Genitourinary: Negative for dysuria, frequency, genital sores, hematuria, pelvic pain, urgency, vaginal bleeding and vaginal discharge.   Musculoskeletal: Negative for arthralgias, joint swelling and myalgias.   Skin: Negative for rash.   Neurological: Negative for dizziness, weakness, headaches and paresthesias.   Psychiatric/Behavioral: Negative for mood changes. The patient is not nervous/anxious.      {ROS COMP  "(Optional):364278}    OBJECTIVE:   /72 (BP Location: Right arm, Patient Position: Chair, Cuff Size: Adult Large)  Pulse 90  Temp 98.2  F (36.8  C) (Oral)  Ht 5' 2.4\" (1.585 m)  Wt 329 lb 3.2 oz (149.3 kg)  SpO2 95%  BMI 59.44 kg/m2 Estimated body mass index is 59.44 kg/(m^2) as calculated from the following:    Height as of this encounter: 5' 2.4\" (1.585 m).    Weight as of this encounter: 329 lb 3.2 oz (149.3 kg).  Physical Exam  {Exam (Optional) :999628}    {Diagnostic Test Results (Optional):676151::\"Diagnostic Test Results:\",\"none \"}    ASSESSMENT / PLAN:   {Diag Picklist:133208}    End of Life Planning:  Patient currently has an advanced directive: { :862832}    COUNSELING:  {Medicare Counselin}    BP Readings from Last 1 Encounters:   18 122/72     Estimated body mass index is 59.44 kg/(m^2) as calculated from the following:    Height as of this encounter: 5' 2.4\" (1.585 m).    Weight as of this encounter: 329 lb 3.2 oz (149.3 kg).    {BP Counseling- Complete if BP >= 120/80  (Optional):143284}  {Weight Management Plan (ACO) Complete if BMI is abnormal-  Ages 18-64  BMI >24.9.  Age 65+ with BMI <23 or >30 (Optional):704003}     reports that she has never smoked. She has never used smokeless tobacco.  {Tobacco Cessation -- Complete if patient is a smoker (Optional):496151}    Appropriate preventive services were discussed with this patient, including applicable screening as appropriate for cardiovascular disease, diabetes, osteopenia/osteoporosis, and glaucoma.  As appropriate for age/gender, discussed screening for colorectal cancer, prostate cancer, breast cancer, and cervical cancer. Checklist reviewing preventive services available has been given to the patient.    Reviewed patients plan of care and provided an AVS. The {CarePlan:791069} for Lilliana meets the Care Plan requirement. This Care Plan has been established and reviewed with the {PATIENT, FAMILY MEMBER, " CAREGIVER:037794}.    Counseling Resources:  ATP IV Guidelines  Pooled Cohorts Equation Calculator  Breast Cancer Risk Calculator  FRAX Risk Assessment  ICSI Preventive Guidelines  Dietary Guidelines for Americans, 2010  USDA's MyPlate  ASA Prophylaxis  Lung CA Screening    Hugh Leon MD  [facility]

## 2018-11-28 NOTE — PATIENT INSTRUCTIONS
Preventive Health Recommendations    See your health care provider every year to    Review health changes.     Discuss preventive care.      Review your medicines.      You no longer need a yearly Pap test unless you've had an abnormal Pap test in the past 10 years. If you have vaginal symptoms, such as bleeding or discharge, be sure to talk with your provider about a Pap test.    Every 1 to 2 years, have a mammogram.        You will be contacted to schedule a colonoscopy.       Have a cholesterol test at least every 5 years.       Have a diabetes test (fasting glucose) every 1-2 years.    Shots:    Get a flu shot each year.    Get a tetanus shot every 10 years.    Talk to your pharmacist about the shingles vaccine.    Nutrition:     Eat at least 5 servings of fruits and vegetables each day.      Eat whole-grain bread, whole-wheat pasta and brown rice instead of white grains and rice.      Get adequate Calcium and Vitamin D.     Lifestyle    Exercise at least 150 minutes a week (30 minutes a day, 5 days a week). This will help you control your weight and prevent disease.      Limit alcohol to one drink per day.      No smoking.       Wear sunscreen to prevent skin cancer.       See your dentist twice a year for an exam and cleaning.      See your eye doctor every 1 to 2 years to screen for conditions such as glaucoma, macular degeneration and cataracts.    Personalized Prevention Plan  You are due for the preventive services outlined below.  Your care team is available to assist you in scheduling these services.  If you have already completed any of these items, please share that information with your care team to update in your medical record.  Health Maintenance Due   Topic Date Due     Discuss Advance Directive Planning  01/04/2017     Colonoscopy - ever 10 years  08/27/2017     FALL RISK ASSESSMENT  08/07/2018     Mammogram - yearly  09/15/2018

## 2018-11-28 NOTE — MR AVS SNAPSHOT
After Visit Summary   11/28/2018    Lilliana Manning    MRN: [id number]           Patient Information     Date Of Birth          1950        Visit Information        Provider Department      11/28/2018 9:20 AM Hugh Leon MD [facility]        Today's Diagnoses     Routine general medical examination at a health care facility    -  1    Essential hypertension, benign          Care Instructions      Preventive Health Recommendations    See your health care provider every year to    Review health changes.     Discuss preventive care.      Review your medicines.      You no longer need a yearly Pap test unless you've had an abnormal Pap test in the past 10 years. If you have vaginal symptoms, such as bleeding or discharge, be sure to talk with your provider about a Pap test.    Every 1 to 2 years, have a mammogram.        You will be contacted to schedule a colonoscopy.       Have a cholesterol test at least every 5 years.       Have a diabetes test (fasting glucose) every 1-2 years.    Shots:    Get a flu shot each year.    Get a tetanus shot every 10 years.    Talk to your pharmacist about the shingles vaccine.    Nutrition:     Eat at least 5 servings of fruits and vegetables each day.      Eat whole-grain bread, whole-wheat pasta and brown rice instead of white grains and rice.      Get adequate Calcium and Vitamin D.     Lifestyle    Exercise at least 150 minutes a week (30 minutes a day, 5 days a week). This will help you control your weight and prevent disease.      Limit alcohol to one drink per day.      No smoking.       Wear sunscreen to prevent skin cancer.       See your dentist twice a year for an exam and cleaning.      See your eye doctor every 1 to 2 years to screen for conditions such as glaucoma, macular degeneration and cataracts.    Personalized Prevention Plan  You are due for the preventive services outlined below.  Your care team is available to assist you in  scheduling these services.  If you have already completed any of these items, please share that information with your care team to update in your medical record.  Health Maintenance Due   Topic Date Due     Discuss Advance Directive Planning  01/04/2017     Colonoscopy - ever 10 years  08/27/2017     FALL RISK ASSESSMENT  08/07/2018     Mammogram - yearly  09/15/2018             Follow-ups after your visit        Your next 10 appointments already scheduled     Nov 28, 2018 10:15 AM CST   MA SCREENING DIGITAL BILATERAL with EAMA1   [facility])    [address] ,[address]  [place] [address] [phone]           How do I prepare for my exam? (Food and drink instructions) No Food and Drink Restrictions.  How do I prepare for my exam? (Other instructions) Do not use any powder, lotion or deodorant under your arms or on your breast. If you do, we will ask you to remove it before your exam.  What should I wear: Wear comfortable, two-piece clothing.  How long does the exam take: Most scans will take 15 minutes.  What should I bring: Bring any previous mammograms from other facilities or have them mailed to the breast center.  Do I need a :  No  is needed.  What do I need to tell my doctor: If you have any allergies, tell your care team.  What should I do after the exam: No restrictions, You may resume normal activities.  What is this test: This test is an x-ray of the breast to look for breast disease. The breast is pressed between two plates to flatten and spread the tissue. An X-ray is taken of the breast from different angles.  Who should I call with questions: If you have any questions, please call the Imaging Department where you will have your exam. Directions, parking instructions, and other information is available on our website, [email or web address].  Other information about my exam Three-dimensional (3D) mammograms are available at [facility]  "locations in [facility], [facility], [place], [facility] and [place].  Health locations include [place] and the [facility] in [place].  Benefits of 3D mammograms include * Improved rate of cancer detection * Decreases your chance of having to go back for more tests, which means fewer: * \"False-positive\" results (This means that there is an abnormal area but it isn't cancer.) * Invasive testing procedures, such as a biopsy or surgery * Can provide clearer images of the breast if you have dense breast tissue.  *3D mammography is an optional exam that anyone can have with a 2D mammogram. It doesn't replace or take the place of a 2D mammogram. 2D mammograms remain an effective screening test for all women.  Not all insurance companies cover the cost of a 3D mammogram. Check with your insurance. Three-dimensional (3D) mammograms are available at [facility] in [facility], [facility], [facility], and [place]. Health locations include [place] and [facility] in [place]. Benefits of 3D mammograms include: - Improved rate of cancer detection - Decreases your chance of having to go back for more tests, which means fewer: - \"False-positive\" results (This means that there is an abnormal area but it isn't cancer.) - Invasive testing procedures, such as a biopsy or surgery - Can provide clearer images of the breast if you have dense breast tissue. 3D mammography is an optional exam that anyone can have with a 2D mammogram. It doesn't replace or take the place of a 2D mammogram. 2D mammograms remain an effective screening test for all women.  Not all insurance companies cover the cost of a 3D mammogram. Check with your insurance.            Feb 05, 2019 10:30 AM CST   New Visit with Kelly Liu MD   [facility]    [address]  [address]  [place] [address] [phone]              Who to contact     If you have questions or need follow up information about today's clinic visit or your schedule please contact [facility] directly at [phone].  Normal or non-critical lab and imaging results will be communicated to you by MyChart, letter or phone within 4 business days after the clinic has received the results. If you do not hear from us within 7 days, please contact the clinic through [facility] or phone. If you have a critical or abnormal lab result, we will notify you by phone as soon as possible.  Submit refill requests through [facility] or call your pharmacy and they will forward the refill request to us. Please allow 3 business days for your refill to be completed.          Additional Information About Your Visit        [email or web address] Information     [facility] gives you secure access to your electronic health record. If you see a primary care provider, you can also send messages to your care team and make appointments. If you have questions, please call your primary care clinic.  If you do not have a primary care provider, please call [phone] and they will assist you.        Care EveryWhere ID     This is your Care EveryWhere ID. This could be used by other organizations to access your [facility] medical records  [id number]        Your Vitals Were     Pulse Temperature Height Pulse Oximetry BMI (Body Mass Index)       90 98.2  F (36.8  C) (Oral) 5' 2.4\" (1.585 m) 95% 59.44 kg/m2        Blood Pressure from Last 3 Encounters:   11/28/18 122/72   11/07/18 132/70   08/07/17 136/84    Weight from Last 3 Encounters:   11/28/18 329 lb 3.2 oz (149.3 kg)   11/07/18 (!) 338 lb 9 oz (153.6 kg)   08/07/17 (!) 324 lb (147 kg)              We Performed the Following     Basic metabolic panel     Lipid panel reflex to direct LDL Fasting        Primary Care Provider Office Phone # Fax #    Hugh Leon [phone] [place] [address]        Equal Access to Services     [facility] Hospital[facility] : Gene Washington, wascottda lazara, qaybta eyad luther. So [facility] [phone].    ATENCIÓN: Si habla español, tiene a [place] disposición servicios gratuitos de asistencia lingüística. Llame al [phone].    We comply with applicable federal civil rights laws and [place] laws. We do not discriminate on the basis of race, color, national origin, age, disability, sex, sexual orientation, or gender identity.            Thank you!     [facility] you for [facility]. Our goal is always to provide you with excellent care. Hearing back from our patients is one way we can continue to improve our services. Please take a few minutes to complete the written survey that you may receive in the mail after your visit with us. Thank you!             Your Updated Medication List - Protect others around you: Learn how to safely use, store and throw away your medicines at www.disposemymeds.org.          This list is accurate as of 11/28/18  9:52 AM.  Always use your most recent med list.                   Brand Name Dispense Instructions for use Diagnosis    calcium carbonate 600 mg-vitamin D 400 units 600-400 MG-UNIT per tablet    CALTRATE     Take 1 tablet by mouth 2 times daily        hydrochlorothiazide 50 MG tablet    HYDRODIURIL    90 tablet    Take 1 tablet (50 mg) by mouth daily    Localized edema, Essential hypertension, benign       lisinopril 10 MG tablet    PRINIVIL/ZESTRIL    90 tablet    TAKE 1 TABLET (10 MG) BY MOUTH DAILY    Essential hypertension, benign       Multi-vitamin tablet     100 tablet    Take 1 tablet by mouth daily

## 2018-11-28 NOTE — PROGRESS NOTES
"   SUBJECTIVE:   CC: Lilliana Manning is an 68 year old woman who presents for preventive health visit.     Annual Wellness Visit     In general, how would you rate your overall health?  Fair    Frequency of exercise:  None    Do you usually eat at least 4 servings of fruit and vegetables a day, include whole grains    & fiber and avoid regularly eating high fat or \"junk\" foods?  No    Taking medications regularly:  Yes    Medication side effects:  None    Ability to successfully perform activities of daily living:  No assistance needed    Home Safety:  No safety concerns identified    Hearing Impairment:  No hearing concerns    In the past 6 months, have you been bothered by leaking of urine?  No    In general, how would you rate your overall mental or emotional health?  Good    PHQ-2 Total Score: 0    Additional concerns today:  No    {additional problems to add (Optional):164033}    Today's PHQ-2 Score:   PHQ-2 ( 1999 Pfizer) 11/25/2018   Q1: Little interest or pleasure in doing things 0   Q2: Feeling down, depressed or hopeless 0   PHQ-2 Score 0   Q1: Little interest or pleasure in doing things Not at all   Q2: Feeling down, depressed or hopeless Not at all   PHQ-2 Score 0       Abuse: Current or Past(Physical, Sexual or Emotional)- No  Do you feel safe in your environment? Yes    Social History   Substance Use Topics     Smoking status: Never Smoker     Smokeless tobacco: Never Used     Alcohol use 0.0 oz/week     0 Standard drinks or equivalent per week      Comment: 1 drink weekly     Alcohol Use 11/25/2018   If you drink alcohol do you typically have greater than 3 drinks per day OR greater than 7 drinks per week? No       Reviewed orders with patient.  Reviewed health maintenance and updated orders accordingly - Yes  {Chronicprobdata (Optional):243782}    {Mammo Decision Support (Optional):168998}    Pertinent mammograms are reviewed under the imaging tab.  History of abnormal Pap smear: {PAP " "HX:083978}  PAP / HPV 8/21/2015 1/4/2012 5/11/2007   PAP NIL NIL NIL     Reviewed and updated as needed this visit by clinical staff         Reviewed and updated as needed this visit by Provider        {HISTORY OPTIONS (Optional):406484}    Review of Systems   Constitutional: Negative for chills and fever.   HENT: Negative for congestion, ear pain, hearing loss and sore throat.    Eyes: Negative for pain and visual disturbance.   Respiratory: Negative for cough and shortness of breath.    Cardiovascular: Positive for peripheral edema. Negative for chest pain and palpitations.   Gastrointestinal: Negative for abdominal pain, constipation, diarrhea, heartburn, hematochezia and nausea.   Breasts:  Negative for tenderness, breast mass and discharge.   Genitourinary: Negative for dysuria, frequency, genital sores, hematuria, pelvic pain, urgency, vaginal bleeding and vaginal discharge.   Musculoskeletal: Negative for arthralgias, joint swelling and myalgias.   Skin: Negative for rash.   Neurological: Negative for dizziness, weakness, headaches and paresthesias.   Psychiatric/Behavioral: Negative for mood changes. The patient is not nervous/anxious.      {FEMALE ROS (Optional):553546}     OBJECTIVE:   There were no vitals taken for this visit.  Physical Exam  {Exam Choices (Optional):387821}    {Diagnostic Test Results (Optional):834871::\"Diagnostic Test Results:\",\"none \"}    ASSESSMENT/PLAN:   {Diag Picklist:316934}    COUNSELING:  {FEMALE COUNSELING MESSAGES:114458::\"Reviewed preventive health counseling, as reflected in patient instructions\"}    BP Readings from Last 1 Encounters:   11/07/18 132/70     Estimated body mass index is 61.43 kg/(m^2) as calculated from the following:    Height as of 11/7/18: 5' 2.25\" (1.581 m).    Weight as of 11/7/18: 338 lb 9 oz (153.6 kg).    {BP Counseling- Complete if BP >= 120/80  (Optional):163088}  {Weight Management Plan (ACO) Complete if BMI is abnormal-  Ages 18-64  BMI >24.9.  " Age 65+ with BMI <23 or >30 (Optional):295312}     reports that she has never smoked. She has never used smokeless tobacco.  {Tobacco Cessation -- Complete if patient is a smoker (Optional):904533}    Counseling Resources:  ATP IV Guidelines  Pooled Cohorts Equation Calculator  Breast Cancer Risk Calculator  FRAX Risk Assessment  ICSI Preventive Guidelines  Dietary Guidelines for Americans, 2010  [employer]'s MyPlate  ASA Prophylaxis  Lung CA Screening    Hugh Leon MD  [facility]

## 2018-11-30 ENCOUNTER — TRANSFERRED RECORDS (OUTPATIENT)
Dept: HEALTH INFORMATION MANAGEMENT | Facility: CLINIC | Age: 68
End: 2018-11-30

## 2018-11-30 LAB — HEMOCCULT STL QL IA: NEGATIVE

## 2018-12-11 ENCOUNTER — DOCUMENTATION ONLY (OUTPATIENT)
Dept: OTHER | Facility: CLINIC | Age: 68
End: 2018-12-11

## 2019-01-04 ENCOUNTER — DOCUMENTATION ONLY (OUTPATIENT)
Dept: OTHER | Facility: CLINIC | Age: 69
End: 2019-01-04

## 2019-02-05 ENCOUNTER — OFFICE VISIT (OUTPATIENT)
Dept: DERMATOLOGY | Facility: CLINIC | Age: 69
End: 2019-02-05
Payer: COMMERCIAL

## 2019-02-05 DIAGNOSIS — L91.8 SKIN TAG: ICD-10-CM

## 2019-02-05 DIAGNOSIS — L82.0 INFLAMED SEBORRHEIC KERATOSIS: ICD-10-CM

## 2019-02-05 DIAGNOSIS — L82.1 SEBORRHEIC KERATOSIS: ICD-10-CM

## 2019-02-05 DIAGNOSIS — D48.5 NEOPLASM OF UNCERTAIN BEHAVIOR OF SKIN: Primary | ICD-10-CM

## 2019-02-05 DIAGNOSIS — D22.9 MULTIPLE NEVI: ICD-10-CM

## 2019-02-05 PROCEDURE — 11102 TANGNTL BX SKIN SINGLE LES: CPT | Mod: 59 | Performed by: DERMATOLOGY

## 2019-02-05 PROCEDURE — 88305 TISSUE EXAM BY PATHOLOGIST: CPT | Mod: TC | Performed by: DERMATOLOGY

## 2019-02-05 PROCEDURE — 99203 OFFICE O/P NEW LOW 30 MIN: CPT | Mod: 25 | Performed by: DERMATOLOGY

## 2019-02-05 PROCEDURE — 17110 DESTRUCTION B9 LES UP TO 14: CPT | Performed by: DERMATOLOGY

## 2019-02-05 RX ORDER — CALCIUM/MAGNESIUM/ZINC 333-133 MG
TABLET ORAL
COMMUNITY
End: 2022-01-14

## 2019-02-05 RX ORDER — UBIDECARENONE 100 MG
CAPSULE ORAL DAILY
COMMUNITY

## 2019-02-05 RX ORDER — MULTIVIT WITH MINERALS/LUTEIN
1000 TABLET ORAL DAILY
COMMUNITY

## 2019-02-05 RX ORDER — LIDOCAINE HYDROCHLORIDE AND EPINEPHRINE 10; 10 MG/ML; UG/ML
3 INJECTION, SOLUTION INFILTRATION; PERINEURAL ONCE
Status: DISCONTINUED | OUTPATIENT
Start: 2019-02-05 | End: 2022-01-14

## 2019-02-05 RX ORDER — ACETAMINOPHEN 160 MG
TABLET,DISINTEGRATING ORAL
COMMUNITY

## 2019-02-05 NOTE — LETTER
2/5/2019    RE: Lilliana Manning  [address] [place] [address]               Dermatology Clinic Note    Dermatology Problem List:  1. Irritated nevus biopsy on 2/5/19, posterior neck  2. Benign pigmented nevi   3. History of severe peeling sunburn on the back  4. Seborrheic keratoses  5. Skin tags    Assessment and Plan:    1. Irritated nevus biopsy on 2/5/19, posterior neck. Wound info provided.     2. Benign pigmented nevi: No lesions of concern. Sun protection recommended. Discussed ABCDEs of malignant melanoma.      3. Seborrheic keratoses: Benign hyperkeratotic papules and plaques. No treatment advised unless irritation occurs.      4. Skin tags- no treatment advised    5. Seborrheic keratosis, irritated: lesion on the R lateral canthus treated with 10 sec freeze with liquid nitrogen. Wound info provided.     RTC as needed.     Thank you for involving me in this patient's care.     Kelly Liu MD  Dermatology Staff    CC:  Hugh Leon MD    ____________________________________________________________________________________________________________________________________________    CC: Patient presents with:  Consult: new pt, pcp Dr. Leon, start sx Dermatitis tx       HPI: Lilliana Manning is a 68 year old female presenting for initial evaluation of skin lesions seen at the request of Dr. Leon.  No history of skin cancer. No painful or bleeding areas.       She notes that she has a scaling spot on the R forearm present for about 10 years. No treatment. Has a mole on the back of her neck that gets itchy and irritated by clothing rubbing. Has a bump by the R eye that also gets irritated when she rubs her eye. Notes a history of peeling sunburn on her back more than 30 years ago.       Patient Active Problem List   Diagnosis     Essential hypertension, benign     Obesity     CARDIOVASCULAR SCREENING; LDL GOAL LESS THAN 160     Systolic murmur       No Known Allergies      Current Outpatient  Medications   Medication     calcium-vitamin D (CALTRATE) 600-400 MG-UNIT per tablet     hydrochlorothiazide (HYDRODIURIL) 50 MG tablet     lisinopril (PRINIVIL/ZESTRIL) 10 MG tablet     multivitamin, therapeutic with minerals (MULTI-VITAMIN) TABS     No current facility-administered medications for this visit.        Family History   Problem Relation Age of Onset     Heart Disease Mother          of an arrhythmia, also hx of MI     Cancer Maternal Grandmother      Cancer Sister      Breast Cancer Sister      Cancer Father 84        Lung Cancer     Heart Disease Father      C.A.D. Father 85        MI at age 85   No history of skin cancer.     Social History     Socioeconomic History     Marital status: Single     Spouse name: Not on file     Number of children: Not on file     Years of education: Not on file     Highest education level: Not on file   Social Needs     Financial resource strain: Not on file     Food insecurity - worry: Not on file     Food insecurity - inability: Not on file     Transportation needs - medical: Not on file     Transportation needs - non-medical: Not on file   Occupational History     Not on file   Tobacco Use     Smoking status: Never Smoker     Smokeless tobacco: Never Used   Substance and Sexual Activity     Alcohol use: Yes     Alcohol/week: 0.0 oz     Comment: 1 drink weekly     Drug use: No     Sexual activity: No   Other Topics Concern     Parent/sibling w/ CABG, MI or angioplasty before 65F 55M? Not Asked   Social History Narrative     Not on file       ROS: Feeling well without other skin concerns.     EXAM:  There were no vitals taken for this visit.  GEN: Alert, no distress  HEENT: Conjunctiva clear.   PULM: Breathing comfortably on RA  CV: Extrem warm and well perfused  ABD: No distension  SKIN: Exam of the face, neck, chest, abdomen, back, arms, hands. Normal except as follows:  --3 mm waxy papule on the R lateral canthus  --Scattered medium and dark brown 2-4 mm  macules on the R helix, upper back, bilateral extensor upper arms  --Central posterior neck with pink brown approx 5 mm papule  --Scattered waxy tan papules on the bilateral mid and upper back  --R forearm with flat topped 1 cm waxy plaque, tan  --Fleshy papules on the lateral neck    Shave biopsy:  After discussion of benefits and risks including but not limited to bleeding/bruising, pain/swelling, infection, scar, incomplete removal, nerve damage/numbness, recurrence, and non-diagnostic biopsy, written consent, verbal consent and photographs were obtained. Time-out was performed. The area was cleaned with isopropyl alcohol. 1 mL of 1% lidocaine with epinephrine was injected to obtain adequate anesthesia of the lesion on the posterior neck. A shave biopsy was performed. Hemostasis was achieved with aluminium chloride. Vaseline and a sterile dressing were applied. The patient tolerated the procedure and no complications were noted. The patient was provided with verbal and written post care instructions.       Kelly Liu MD

## 2019-02-05 NOTE — PROGRESS NOTES
Dermatology Clinic Note    Dermatology Problem List:  1. Irritated nevus biopsy on 2/5/19, posterior neck  2. Benign pigmented nevi   3. History of severe peeling sunburn on the back  4. Seborrheic keratoses  5. Skin tags    Assessment and Plan:    1. Irritated nevus biopsy on 2/5/19, posterior neck. Wound info provided.     2. Benign pigmented nevi: No lesions of concern. Sun protection recommended. Discussed ABCDEs of malignant melanoma.      3. Seborrheic keratoses: Benign hyperkeratotic papules and plaques. No treatment advised unless irritation occurs.      4. Skin tags- no treatment advised    5. Seborrheic keratosis, irritated: lesion on the R lateral canthus treated with 10 sec freeze with liquid nitrogen. Wound info provided.     RTC as needed.     Thank you for involving me in this patient's care.     Kelly Liu MD  Dermatology Staff    CC:  Hugh Leon MD    ____________________________________________________________________________________________________________________________________________    CC: Patient presents with:  Consult: new pt, pcp Dr. Leon, start sx Dermatitis tx       HPI: Lilliana Manning is a 68 year old female presenting for initial evaluation of skin lesions seen at the request of Dr. Leon.  No history of skin cancer. No painful or bleeding areas.       She notes that she has a scaling spot on the R forearm present for about 10 years. No treatment. Has a mole on the back of her neck that gets itchy and irritated by clothing rubbing. Has a bump by the R eye that also gets irritated when she rubs her eye. Notes a history of peeling sunburn on her back more than 30 years ago.       Patient Active Problem List   Diagnosis     Essential hypertension, benign     Obesity     CARDIOVASCULAR SCREENING; LDL GOAL LESS THAN 160     Systolic murmur       No Known Allergies      Current Outpatient Medications   Medication     calcium-vitamin D (CALTRATE) 600-400 MG-UNIT per  tablet     hydrochlorothiazide (HYDRODIURIL) 50 MG tablet     lisinopril (PRINIVIL/ZESTRIL) 10 MG tablet     multivitamin, therapeutic with minerals (MULTI-VITAMIN) TABS     No current facility-administered medications for this visit.        Family History   Problem Relation Age of Onset     Heart Disease Mother          of an arrhythmia, also hx of MI     Cancer Maternal Grandmother      Cancer Sister      Breast Cancer Sister      Cancer Father 84        Lung Cancer     Heart Disease Father      C.A.D. Father 85        MI at age 85   No history of skin cancer.     Social History     Socioeconomic History     Marital status: Single     Spouse name: Not on file     Number of children: Not on file     Years of education: Not on file     Highest education level: Not on file   Social Needs     Financial resource strain: Not on file     Food insecurity - worry: Not on file     Food insecurity - inability: Not on file     Transportation needs - medical: Not on file     Transportation needs - non-medical: Not on file   Occupational History     Not on file   Tobacco Use     Smoking status: Never Smoker     Smokeless tobacco: Never Used   Substance and Sexual Activity     Alcohol use: Yes     Alcohol/week: 0.0 oz     Comment: 1 drink weekly     Drug use: No     Sexual activity: No   Other Topics Concern     Parent/sibling w/ CABG, MI or angioplasty before 65F 55M? Not Asked   Social History Narrative     Not on file         ROS: Feeling well without other skin concerns.     EXAM:  There were no vitals taken for this visit.  GEN: Alert, no distress  HEENT: Conjunctiva clear.   PULM: Breathing comfortably on RA  CV: Extrem warm and well perfused  ABD: No distension  SKIN: Exam of the face, neck, chest, abdomen, back, arms, hands. Normal except as follows:  --3 mm waxy papule on the R lateral canthus  --Scattered medium and dark brown 2-4 mm macules on the R helix, upper back, bilateral extensor upper arms  --Central  posterior neck with pink brown approx 5 mm papule  --Scattered waxy tan papules on the bilateral mid and upper back  --R forearm with flat topped 1 cm waxy plaque, tan  --Fleshy papules on the lateral neck    Shave biopsy:  After discussion of benefits and risks including but not limited to bleeding/bruising, pain/swelling, infection, scar, incomplete removal, nerve damage/numbness, recurrence, and non-diagnostic biopsy, written consent, verbal consent and photographs were obtained. Time-out was performed. The area was cleaned with isopropyl alcohol. 1 mL of 1% lidocaine with epinephrine was injected to obtain adequate anesthesia of the lesion on the posterior neck. A shave biopsy was performed. Hemostasis was achieved with aluminium chloride. Vaseline and a sterile dressing were applied. The patient tolerated the procedure and no complications were noted. The patient was provided with verbal and written post care instructions.

## 2019-02-11 LAB — COPATH REPORT: NORMAL

## 2019-09-29 ENCOUNTER — HEALTH MAINTENANCE LETTER (OUTPATIENT)
Age: 69
End: 2019-09-29

## 2020-01-03 ASSESSMENT — ENCOUNTER SYMPTOMS
HEMATOCHEZIA: 0
FREQUENCY: 0
PALPITATIONS: 0
MYALGIAS: 0
PARESTHESIAS: 0
ABDOMINAL PAIN: 0
FEVER: 0
ARTHRALGIAS: 0
COUGH: 0
HEMATURIA: 0
DIZZINESS: 0
EYE PAIN: 0
HEARTBURN: 0
BREAST MASS: 0
CONSTIPATION: 0
SORE THROAT: 0
NAUSEA: 0
WEAKNESS: 0
DYSURIA: 0
NERVOUS/ANXIOUS: 0
SHORTNESS OF BREATH: 0
HEADACHES: 0
DIARRHEA: 1
CHILLS: 0
JOINT SWELLING: 0

## 2020-01-03 ASSESSMENT — ACTIVITIES OF DAILY LIVING (ADL): CURRENT_FUNCTION: NO ASSISTANCE NEEDED

## 2020-01-03 NOTE — PROGRESS NOTES
"SUBJECTIVE:   Lilliana Manning is a 69 year old female who presents for Preventive Visit.    Are you in the first 12 months of your Medicare coverage?  No    Healthy Habits:     In general, how would you rate your overall health?  Fair    Frequency of exercise:  None    Do you usually eat at least 4 servings of fruit and vegetables a day, include whole grains    & fiber and avoid regularly eating high fat or \"junk\" foods?  Yes    Taking medications regularly:  Yes    Medication side effects:  Not applicable    Ability to successfully perform activities of daily living:  No assistance needed    Home Safety:  No safety concerns identified    Hearing Impairment:  No hearing concerns    In the past 6 months, have you been bothered by leaking of urine?  No    In general, how would you rate your overall mental or emotional health?  Good      PHQ-2 Total Score: 0    Additional concerns today:  No    Do you feel safe in your environment? Yes    Have you ever done Advance Care Planning? (For example, a Health Directive, POLST, or a discussion with a medical provider or your loved ones about your wishes): Yes, advance care planning is on file.      Fall risk  Fallen 2 or more times in the past year?: No  Any fall with injury in the past year?: No    Cognitive Screening   1) Repeat 3 items (Leader, Season, Table)    2) Clock draw: NORMAL  3) 3 item recall: Recalls 3 objects  Results: 3 items recalled: COGNITIVE IMPAIRMENT LESS LIKELY    Mini-CogTM Copyright CORY Cooley. Licensed by the author for use in [facility]; reprinted with permission (josi@.[facility]). All rights reserved.      Do you have sleep apnea, excessive snoring or daytime drowsiness?: no      Reviewed and updated as needed this visit by Provider  Tobacco  Allergies  Meds  Problems  Med Hx  Surg Hx  Fam Hx        Social History     Tobacco Use     Smoking status: Never Smoker     Smokeless tobacco: Never Used   Substance Use Topics     Alcohol use: " Yes     Alcohol/week: 0.0 standard drinks     Comment: 1 drink weekly     If you drink alcohol do you typically have >3 drinks per day or >7 drinks per week? No    Alcohol Use 1/3/2020   Prescreen: >3 drinks/day or >7 drinks/week? No   Prescreen: >3 drinks/day or >7 drinks/week? -       Itchy lower right calf, comes and goes.  Dry skin.  Uses moisturizer prn. Discussed increasing frequency and amount.    HTN, chronic leg edema.   No cardiac sx such as CP, palpitations, PND, orthopnea, NGO or worsening peripheral edema.   BP Readings from Last 3 Encounters:   01/06/20 120/80   11/28/18 122/72   11/07/18 132/70       Current providers sharing in care for this patient include:   Patient Care Team:  Hugh Leon MD as PCP - General  Hugh Leon MD as Assigned PCP    The following health maintenance items are reviewed in Epic and correct as of today:  Health Maintenance   Topic Date Due     ANNUAL REVIEW OF HM ORDERS  1950     ZOSTER IMMUNIZATION (2 of 3) 11/06/2013     COLONOSCOPY  08/27/2017     MEDICARE ANNUAL WELLNESS VISIT  11/28/2019     FALL RISK ASSESSMENT  11/28/2019     MAMMO SCREENING  11/28/2019     DEXA  11/28/2021     LIPID  11/28/2023     ADVANCE CARE PLANNING  01/04/2024     DTAP/TDAP/TD IMMUNIZATION (3 - Td) 08/07/2027     PHQ-2  Completed     INFLUENZA VACCINE  Completed     PNEUMOCOCCAL IMMUNIZATION 65+ LOW/MEDIUM RISK  Completed     HEPATITIS C SCREENING  Addressed     IPV IMMUNIZATION  Aged Out     MENINGITIS IMMUNIZATION  Aged Out       Review of Systems   Constitutional: Negative for chills and fever.   HENT: Negative for congestion, ear pain, hearing loss and sore throat.    Eyes: Negative for pain and visual disturbance.   Respiratory: Negative for cough and shortness of breath.    Cardiovascular: Positive for peripheral edema. Negative for chest pain and palpitations.   Gastrointestinal: Positive for diarrhea. Negative for abdominal pain, constipation, heartburn, hematochezia and  "nausea.   Breasts:  Negative for tenderness, breast mass and discharge.   Genitourinary: Negative for dysuria, frequency, genital sores, hematuria, pelvic pain, urgency, vaginal bleeding and vaginal discharge.   Musculoskeletal: Negative for arthralgias, joint swelling and myalgias.   Skin: Negative for rash.   Neurological: Negative for dizziness, weakness, headaches and paresthesias.   Psychiatric/Behavioral: Negative for mood changes. The patient is not nervous/anxious.          OBJECTIVE:   /80 (BP Location: Right arm, Patient Position: Sitting, Cuff Size: Adult Regular)   Pulse 104   Temp 97.8  F (36.6  C) (Tympanic)   Resp 16   Ht 1.579 m (5' 2.17\")   Wt (!) 153.8 kg (339 lb)   SpO2 100%   BMI 61.68 kg/m   Estimated body mass index is 61.68 kg/m  as calculated from the following:    Height as of this encounter: 1.579 m (5' 2.17\").    Weight as of this encounter: 153.8 kg (339 lb).  Physical Exam  GENERAL: healthy, alert and no distress  EYES: Eyes grossly normal to inspection, PERRL and conjunctivae and sclerae normal  HENT: ear canals and TM's normal, nose and mouth without ulcers or lesions  NECK: no adenopathy, no asymmetry, masses, or scars and thyroid normal to palpation  RESP: lungs clear to auscultation - no rales, rhonchi or wheezes  CV: regular rate and rhythm, normal S1 S2, no S3 or S4, no murmur, click or rub, no peripheral edema and peripheral pulses strong  ABDOMEN: soft, nontender, no hepatosplenomegaly, no masses and bowel sounds normal  MS: no gross musculoskeletal defects noted, no edema  SKIN: no suspicious lesions or rashes  NEURO: Normal strength and tone, mentation intact and speech normal  PSYCH: mentation appears normal, affect normal/bright      ASSESSMENT / PLAN:       ICD-10-CM    1. Encounter for Medicare annual wellness exam Z00.00 Basic metabolic panel   2. BENIGN HYPERTENSION I10 lisinopril (PRINIVIL/ZESTRIL) 10 MG tablet     hydrochlorothiazide (HYDRODIURIL) 50 MG " "tablet     Basic metabolic panel   3. Localized edema R60.0 hydrochlorothiazide (HYDRODIURIL) 50 MG tablet     Basic metabolic panel   4. Screen for colon cancer Z12.11        COUNSELING:  Reviewed preventive health counseling, as reflected in patient instructions    Estimated body mass index is 61.68 kg/m  as calculated from the following:    Height as of this encounter: 1.579 m (5' 2.17\").    Weight as of this encounter: 153.8 kg (339 lb).    Weight management plan: Discussed healthy diet and exercise guidelines     reports that she has never smoked. She has never used smokeless tobacco.      Appropriate preventive services were discussed with this patient, including applicable screening as appropriate for cardiovascular disease, diabetes, osteopenia/osteoporosis, and glaucoma.  As appropriate for age/gender, discussed screening for colorectal cancer, breast cancer, and cervical cancer. Checklist reviewing preventive services available has been given to the patient.    Reviewed patients plan of care and provided an AVS. The Basic Care Plan (routine screening as documented in Health Maintenance) for Lilliana meets the Care Plan requirement. This Care Plan has been established and reviewed with the Patient.    Counseling Resources:  ATP IV Guidelines  Pooled Cohorts Equation Calculator  Breast Cancer Risk Calculator  FRAX Risk Assessment  ICSI Preventive Guidelines  Dietary Guidelines for Americans, 2010  USDA's MyPlate  ASA Prophylaxis  Lung CA Screening    Hugh Leon MD  [facility]    Identified Health Risks:  "

## 2020-01-03 NOTE — PATIENT INSTRUCTIONS
Patient Education   Personalized Prevention Plan  You are due for the preventive services outlined below.  Your care team is available to assist you in scheduling these services.  If you have already completed any of these items, please share that information with your care team to update in your medical record.  Health Maintenance Due   Topic Date Due     ANNUAL REVIEW OF HM ORDERS  1950     Zoster (Shingles) Vaccine (2 of 3) 11/06/2013     Colonoscopy  08/27/2017     Annual Wellness Visit  11/28/2019     FALL RISK ASSESSMENT  11/28/2019     PHQ-2  01/01/2020     Mammogram  11/28/2019

## 2020-01-06 ENCOUNTER — ANCILLARY PROCEDURE (OUTPATIENT)
Dept: MAMMOGRAPHY | Facility: CLINIC | Age: 70
End: 2020-01-06
Attending: INTERNAL MEDICINE
Payer: COMMERCIAL

## 2020-01-06 ENCOUNTER — OFFICE VISIT (OUTPATIENT)
Dept: PEDIATRICS | Facility: CLINIC | Age: 70
End: 2020-01-06
Payer: COMMERCIAL

## 2020-01-06 VITALS
OXYGEN SATURATION: 100 % | WEIGHT: 293 LBS | BODY MASS INDEX: 53.92 KG/M2 | SYSTOLIC BLOOD PRESSURE: 120 MMHG | HEART RATE: 104 BPM | TEMPERATURE: 97.8 F | RESPIRATION RATE: 16 BRPM | DIASTOLIC BLOOD PRESSURE: 80 MMHG | HEIGHT: 62 IN

## 2020-01-06 DIAGNOSIS — R60.0 LOCALIZED EDEMA: ICD-10-CM

## 2020-01-06 DIAGNOSIS — Z12.11 SCREEN FOR COLON CANCER: ICD-10-CM

## 2020-01-06 DIAGNOSIS — Z00.00 ENCOUNTER FOR MEDICARE ANNUAL WELLNESS EXAM: Primary | ICD-10-CM

## 2020-01-06 DIAGNOSIS — I10 ESSENTIAL HYPERTENSION, BENIGN: ICD-10-CM

## 2020-01-06 DIAGNOSIS — Z12.31 VISIT FOR SCREENING MAMMOGRAM: ICD-10-CM

## 2020-01-06 PROCEDURE — 77067 SCR MAMMO BI INCL CAD: CPT | Mod: TC

## 2020-01-06 PROCEDURE — 80048 BASIC METABOLIC PNL TOTAL CA: CPT | Performed by: INTERNAL MEDICINE

## 2020-01-06 PROCEDURE — 99397 PER PM REEVAL EST PAT 65+ YR: CPT | Performed by: INTERNAL MEDICINE

## 2020-01-06 PROCEDURE — 36415 COLL VENOUS BLD VENIPUNCTURE: CPT | Performed by: INTERNAL MEDICINE

## 2020-01-06 RX ORDER — UREA 10 %
500 LOTION (ML) TOPICAL DAILY
COMMUNITY
Start: 2020-01-06

## 2020-01-06 RX ORDER — LISINOPRIL 10 MG/1
TABLET ORAL
Qty: 90 TABLET | Refills: 4 | Status: SHIPPED | OUTPATIENT
Start: 2020-01-06 | End: 2021-01-11

## 2020-01-06 RX ORDER — HYDROCHLOROTHIAZIDE 50 MG/1
50 TABLET ORAL DAILY
Qty: 90 TABLET | Refills: 4 | Status: SHIPPED | OUTPATIENT
Start: 2020-01-06 | End: 2021-01-11

## 2020-01-06 ASSESSMENT — MIFFLIN-ST. JEOR: SCORE: 2023.56

## 2020-01-07 LAB
ANION GAP SERPL CALCULATED.3IONS-SCNC: 7 MMOL/L (ref 3–14)
BUN SERPL-MCNC: 22 MG/DL (ref 7–30)
CALCIUM SERPL-MCNC: 9.7 MG/DL (ref 8.5–10.1)
CHLORIDE SERPL-SCNC: 104 MMOL/L (ref 94–109)
CO2 SERPL-SCNC: 27 MMOL/L (ref 20–32)
CREAT SERPL-MCNC: 1.04 MG/DL (ref 0.52–1.04)
GFR SERPL CREATININE-BSD FRML MDRD: 55 ML/MIN/{1.73_M2}
GLUCOSE SERPL-MCNC: 100 MG/DL (ref 70–99)
POTASSIUM SERPL-SCNC: 3.8 MMOL/L (ref 3.4–5.3)
SODIUM SERPL-SCNC: 138 MMOL/L (ref 133–144)

## 2020-01-19 ENCOUNTER — TRANSFERRED RECORDS (OUTPATIENT)
Dept: HEALTH INFORMATION MANAGEMENT | Facility: CLINIC | Age: 70
End: 2020-01-19

## 2020-01-19 LAB — HEMOCCULT STL QL IA: NEGATIVE

## 2020-12-26 ENCOUNTER — TRANSFERRED RECORDS (OUTPATIENT)
Dept: HEALTH INFORMATION MANAGEMENT | Facility: CLINIC | Age: 70
End: 2020-12-26

## 2020-12-26 LAB — HEMOCCULT STL QL IA: NEGATIVE

## 2021-01-13 ENCOUNTER — OFFICE VISIT (OUTPATIENT)
Dept: PEDIATRICS | Facility: CLINIC | Age: 71
End: 2021-01-13
Payer: COMMERCIAL

## 2021-01-13 VITALS
TEMPERATURE: 98.1 F | OXYGEN SATURATION: 97 % | DIASTOLIC BLOOD PRESSURE: 71 MMHG | BODY MASS INDEX: 59.13 KG/M2 | HEART RATE: 96 BPM | SYSTOLIC BLOOD PRESSURE: 134 MMHG | WEIGHT: 293 LBS

## 2021-01-13 DIAGNOSIS — Z00.00 ENCOUNTER FOR MEDICARE ANNUAL WELLNESS EXAM: Primary | ICD-10-CM

## 2021-01-13 DIAGNOSIS — I10 ESSENTIAL HYPERTENSION, BENIGN: ICD-10-CM

## 2021-01-13 DIAGNOSIS — R60.0 LOCALIZED EDEMA: ICD-10-CM

## 2021-01-13 PROCEDURE — 36415 COLL VENOUS BLD VENIPUNCTURE: CPT | Performed by: INTERNAL MEDICINE

## 2021-01-13 PROCEDURE — 99397 PER PM REEVAL EST PAT 65+ YR: CPT | Performed by: INTERNAL MEDICINE

## 2021-01-13 PROCEDURE — 80048 BASIC METABOLIC PNL TOTAL CA: CPT | Performed by: INTERNAL MEDICINE

## 2021-01-13 RX ORDER — LISINOPRIL 10 MG/1
10 TABLET ORAL DAILY
Qty: 90 TABLET | Refills: 3 | Status: SHIPPED | OUTPATIENT
Start: 2021-01-13 | End: 2022-01-14

## 2021-01-13 RX ORDER — HYDROCHLOROTHIAZIDE 50 MG/1
50 TABLET ORAL DAILY
Qty: 90 TABLET | Refills: 3 | Status: SHIPPED | OUTPATIENT
Start: 2021-01-13 | End: 2021-08-03 | Stop reason: ALTCHOICE

## 2021-01-13 ASSESSMENT — ACTIVITIES OF DAILY LIVING (ADL): CURRENT_FUNCTION: NO ASSISTANCE NEEDED

## 2021-01-13 NOTE — PATIENT INSTRUCTIONS
Patient Education   Personalized Prevention Plan  You are due for the preventive services outlined below.  Your care team is available to assist you in scheduling these services.  If you have already completed any of these items, please share that information with your care team to update in your medical record.  Health Maintenance Due   Topic Date Due     ANNUAL REVIEW OF HM ORDERS  1950     Zoster (Shingles) Vaccine (2 of 3) 11/06/2013     PHQ-2  01/01/2021     FALL RISK ASSESSMENT  01/06/2021     Annual Wellness Visit  01/06/2021     Mammogram  01/06/2021     Colorectal Cancer Screening  01/19/2021

## 2021-01-13 NOTE — PROGRESS NOTES
"SUBJECTIVE:   Lilliana Manning is a 70 year old female who presents for Preventive Visit.    Patient has been advised of split billing requirements and indicates understanding: Yes   Are you in the first 12 months of your Medicare coverage?  No    Healthy Habits:    In general, how would you rate your overall health?  Fair    Frequency of exercise:  None    Duration of exercise:  Other    Do you usually eat at least 4 servings of fruit and vegetables a day, include whole grains    & fiber and avoid regularly eating high fat or \"junk\" foods?  No    Taking medications regularly:  Yes    Barriers to taking medications:  Not applicable    Medication side effects:  Not applicable    Ability to successfully perform activities of daily living:  No assistance needed    Home Safety:  No safety concerns identified    Hearing Impairment:  No hearing concerns    In the past 6 months, have you been bothered by leaking of urine?  No    In general, how would you rate your overall mental or emotional health?  Very good      PHQ-2 Total Score:    Additional concerns today:  No    Do you feel safe in your environment? Yes    Have you ever done Advance Care Planning? (For example, a Health Directive, POLST, or a discussion with a medical provider or your loved ones about your wishes): Yes, patient states has an Advance Care Planning document and will bring a copy to the clinic.      Fall risk  Fallen 2 or more times in the past year?: No  Any fall with injury in the past year?: No  click delete button to remove this line now  Cognitive Screening   1) Repeat 3 items (Leader, Season, Table)    2) Clock draw: NORMAL  3) 3 item recall: Recalls 3 objects  Results: 3 items recalled: COGNITIVE IMPAIRMENT LESS LIKELY    Mini-CogTM Copyright CORY Cooley. Licensed by the author for use in [facility]; reprinted with permission (josi@.[facility]). All rights reserved.      Do you have sleep apnea, excessive snoring or daytime drowsiness?: " no    Reviewed and updated as needed this visit by Provider  Tobacco  Allergies  Meds  Problems  Med Hx  Surg Hx  Fam Hx         Social History     Tobacco Use     Smoking status: Never Smoker     Smokeless tobacco: Never Used   Substance Use Topics     Alcohol use: Yes     Alcohol/week: 0.0 standard drinks     Comment: 1 drink weekly     If you drink alcohol do you typically have >3 drinks per day or >7 drinks per week? No    Alcohol Use 1/13/2021   Prescreen: >3 drinks/day or >7 drinks/week? -   Prescreen: >3 drinks/day or >7 drinks/week? No     HTN. No cardiac sx such as CP, palpitations, PND, orthopnea, NGO or peripheral edema.  BP Readings from Last 3 Encounters:   01/13/21 134/71   01/06/20 120/80   11/28/18 122/72     Leg edema. Using HCTZ plus compression stockings. Keeping sx relatively controlled.       Current providers sharing in care for this patient include:   Patient Care Team:  Hugh Leon MD as PCP - General  Hugh Leon MD as Assigned PCP    The following health maintenance items are reviewed in Epic and correct as of today:  Health Maintenance   Topic Date Due     ANNUAL REVIEW OF HM ORDERS  1950     ZOSTER IMMUNIZATION (2 of 3) 11/06/2013     FALL RISK ASSESSMENT  01/06/2021     MAMMO SCREENING  01/06/2021     COLORECTAL CANCER SCREENING  01/19/2021     DEXA  11/28/2021     MEDICARE ANNUAL WELLNESS VISIT  01/13/2022     LIPID  11/28/2023     ADVANCE CARE PLANNING  01/13/2026     DTAP/TDAP/TD IMMUNIZATION (3 - Td) 08/07/2027     PHQ-2  Completed     INFLUENZA VACCINE  Completed     Pneumococcal Vaccine: 65+ Years  Completed     HEPATITIS C SCREENING  Addressed     Pneumococcal Vaccine: Pediatrics (0 to 5 Years) and At-Risk Patients (6 to 64 Years)  Aged Out     IPV IMMUNIZATION  Aged Out     MENINGITIS IMMUNIZATION  Aged Out     HEPATITIS B IMMUNIZATION  Aged Out             OBJECTIVE:   /71 (BP Location: Right arm, Cuff Size: Adult Regular)   Pulse 96   Temp 98.1  F  "(36.7  C) (Tympanic)   Wt 147.4 kg (325 lb)   SpO2 97%   BMI 59.13 kg/m   Estimated body mass index is 59.13 kg/m  as calculated from the following:    Height as of 1/6/20: 1.579 m (5' 2.17\").    Weight as of this encounter: 147.4 kg (325 lb).  Physical Exam  GENERAL: healthy, alert and no distress  EYES: Eyes grossly normal to inspection, PERRL and conjunctivae and sclerae normal  HENT: ear canals and TM's normal  NECK: no adenopathy, no asymmetry, thyroid normal to palpation  RESP: lungs clear to auscultation - no rales, rhonchi or wheezes  CV: regular rate and rhythm, normal S1 S2, 1/6 systolic murmur upper right and left, no click or rub,  peripheral pulses strong  ABDOMEN: soft, nontender, bowel sounds normal  MS: no gross musculoskeletal defects noted, 1+ pretibial edema  SKIN: no suspicious lesions or rashes  NEURO: Normal strength and tone, mentation intact and speech normal  PSYCH: mentation appears normal, affect normal/bright      ASSESSMENT / PLAN:       ICD-10-CM    1. Encounter for Medicare annual wellness exam  Z00.00 Basic metabolic panel  (Ca, Cl, CO2, Creat, Gluc, K, Na, BUN)   2. Localized edema  R60.0 hydrochlorothiazide (HYDRODIURIL) 50 MG tablet   3. BENIGN HYPERTENSION  I10 hydrochlorothiazide (HYDRODIURIL) 50 MG tablet     lisinopril (ZESTRIL) 10 MG tablet         COUNSELING:  Reviewed preventive health counseling, as reflected in patient instructions    Estimated body mass index is 59.13 kg/m  as calculated from the following:    Height as of 1/6/20: 1.579 m (5' 2.17\").    Weight as of this encounter: 147.4 kg (325 lb).    Weight management plan: Discussed healthy diet and exercise guidelines    She reports that she has never smoked. She has never used smokeless tobacco.      Appropriate preventive services were discussed with this patient, including applicable screening as appropriate for cardiovascular disease, diabetes, osteopenia/osteoporosis, and glaucoma.  As appropriate for " age/gender, discussed screening for colorectal cancer, prostate cancer, breast cancer, and cervical cancer. Checklist reviewing preventive services available has been given to the patient.    Reviewed patients plan of care and provided an AVS. The Basic Care Plan (routine screening as documented in Health Maintenance) for Lilliana meets the Care Plan requirement. This Care Plan has been established and reviewed with the Patient.    Counseling Resources:  ATP IV Guidelines  Pooled Cohorts Equation Calculator  Breast Cancer Risk Calculator  Breast Cancer: Medication to Reduce Risk  FRAX Risk Assessment  ICSI Preventive Guidelines  Dietary Guidelines for Americans, 2010  USDA's MyPlate  ASA Prophylaxis  Lung CA Screening    Hugh Leon MD  [facility]    Identified Health Risks:

## 2021-01-14 LAB
ANION GAP SERPL CALCULATED.3IONS-SCNC: 7 MMOL/L (ref 3–14)
BUN SERPL-MCNC: 25 MG/DL (ref 7–30)
CALCIUM SERPL-MCNC: 9.3 MG/DL (ref 8.5–10.1)
CHLORIDE SERPL-SCNC: 104 MMOL/L (ref 94–109)
CO2 SERPL-SCNC: 27 MMOL/L (ref 20–32)
CREAT SERPL-MCNC: 1.14 MG/DL (ref 0.52–1.04)
GFR SERPL CREATININE-BSD FRML MDRD: 49 ML/MIN/{1.73_M2}
GLUCOSE SERPL-MCNC: 94 MG/DL (ref 70–99)
POTASSIUM SERPL-SCNC: 3.6 MMOL/L (ref 3.4–5.3)
SODIUM SERPL-SCNC: 138 MMOL/L (ref 133–144)

## 2021-01-19 DIAGNOSIS — Z12.31 VISIT FOR SCREENING MAMMOGRAM: ICD-10-CM

## 2021-01-19 PROCEDURE — 77067 SCR MAMMO BI INCL CAD: CPT | Mod: TC | Performed by: RADIOLOGY

## 2021-03-09 ENCOUNTER — IMMUNIZATION (OUTPATIENT)
Dept: NURSING | Facility: CLINIC | Age: 71
End: 2021-03-09
Payer: COMMERCIAL

## 2021-03-09 PROCEDURE — 0011A PR COVID VAC MODERNA 100 MCG/0.5 ML IM: CPT

## 2021-03-09 PROCEDURE — 91301 PR COVID VAC MODERNA 100 MCG/0.5 ML IM: CPT

## 2021-04-06 ENCOUNTER — IMMUNIZATION (OUTPATIENT)
Dept: NURSING | Facility: CLINIC | Age: 71
End: 2021-04-06
Attending: INTERNAL MEDICINE
Payer: COMMERCIAL

## 2021-04-06 PROCEDURE — 0012A PR COVID VAC MODERNA 100 MCG/0.5 ML IM: CPT

## 2021-04-06 PROCEDURE — 91301 PR COVID VAC MODERNA 100 MCG/0.5 ML IM: CPT

## 2021-04-07 ENCOUNTER — DOCUMENTATION ONLY (OUTPATIENT)
Dept: ADMINISTRATIVE | Facility: CLINIC | Age: 71
End: 2021-04-07
Payer: COMMERCIAL

## 2021-04-07 DIAGNOSIS — R79.89 ELEVATED SERUM CREATININE: Primary | ICD-10-CM

## 2021-08-03 ENCOUNTER — VIRTUAL VISIT (OUTPATIENT)
Dept: PEDIATRICS | Facility: CLINIC | Age: 71
End: 2021-08-03
Payer: COMMERCIAL

## 2021-08-03 DIAGNOSIS — I10 ESSENTIAL HYPERTENSION, BENIGN: ICD-10-CM

## 2021-08-03 DIAGNOSIS — M10.9 PODAGRA: Primary | ICD-10-CM

## 2021-08-03 PROCEDURE — 99213 OFFICE O/P EST LOW 20 MIN: CPT | Mod: 95 | Performed by: INTERNAL MEDICINE

## 2021-08-03 RX ORDER — SPIRONOLACTONE 50 MG/1
50 TABLET, FILM COATED ORAL DAILY
Qty: 90 TABLET | Refills: 3 | Status: SHIPPED | OUTPATIENT
Start: 2021-08-03 | End: 2022-01-14

## 2021-08-03 NOTE — PROGRESS NOTES
"Davida is a 70 year old who is being evaluated via a billable video visit.      How would you like to obtain your AVS? MyChart  If the video visit is dropped, the invitation should be resent by: Text to cell phone: [phone]  Will anyone else be joining your video visit? No    Video Start Time: 2:20 PM    Assessment & Plan       ICD-10-CM    1. Podagra  M10.9    2. Essential hypertension, benign  I10 spironolactone (ALDACTONE) 50 MG tablet     Clinical suspicion of gout as the cause for her toe pains.  She does not feel she needs rx meds for sx at this time.  Discussed management options.  OK for short-term ibuprofen prn return of sx.   If OTC is not helping call for rx for indomethacin or colchicine.    Stop HCTZ as this can worsen gout.  Due to leg swelling (chronic) change to spironolactone.  Stop K supplement.     Return in 5 months (on 1/14/2022) for Routine Visit.    Hugh Leon MD  [facility]    Subjective   Davida is a 70 year old who presents for the following health issues     HPI     Gout/ Single Inflamed Joint  Onset/Duration: 1 week  Description:   Location: foot - left  Joint Swelling: YES  Redness: YES  Pain: YES  Intensity: moderate  Progression of Symptoms: same and waxing and waning  Accompanying Signs & Symptoms:  Fevers: no  History:   Trauma to the area: no  Previous history of gout: unsure but middle toe   ecent illness: no  Alcohol use: YES- 1 drink every couple weeks  Diuretic use: hydrochlorothiazide?  Precipitating or alleviating factors: None  Therapies tried and outcome: none    About 2 months ago had toe pain and redness for a few days.  Does not recall any trauma at that time.  Sx abated after 3-4 days.    1 week ago, left foot with swelling, stiffness, \"achy\" feeling.   One night had just a sheet over her foot, caused severe pain in the toe.            Objective           Vitals:  No vitals were obtained today due to virtual visit.    Physical Exam   GEN: No " distress  SKIN: No visible rashes  NECK: Supple  LUNGS: No active cough, wheezing.   PSYCH: Normal affect. Well groomed.             Video-Visit Details    Type of service:  Video Visit    Video End Time:2:36 PM    Originating Location (pt. Location): Home    Distant Location (provider location):  [facility]     Platform used for Video Visit: Yand[employer]

## 2021-10-24 ENCOUNTER — HEALTH MAINTENANCE LETTER (OUTPATIENT)
Age: 71
End: 2021-10-24

## 2022-01-11 SDOH — ECONOMIC STABILITY: INCOME INSECURITY: IN THE LAST 12 MONTHS, WAS THERE A TIME WHEN YOU WERE NOT ABLE TO PAY THE MORTGAGE OR RENT ON TIME?: NO

## 2022-01-11 SDOH — HEALTH STABILITY: PHYSICAL HEALTH: ON AVERAGE, HOW MANY MINUTES DO YOU ENGAGE IN EXERCISE AT THIS LEVEL?: 0 MIN

## 2022-01-11 SDOH — ECONOMIC STABILITY: TRANSPORTATION INSECURITY
IN THE PAST 12 MONTHS, HAS LACK OF TRANSPORTATION KEPT YOU FROM MEETINGS, WORK, OR FROM GETTING THINGS NEEDED FOR DAILY LIVING?: NO

## 2022-01-11 SDOH — ECONOMIC STABILITY: FOOD INSECURITY: WITHIN THE PAST 12 MONTHS, YOU WORRIED THAT YOUR FOOD WOULD RUN OUT BEFORE YOU GOT MONEY TO BUY MORE.: NEVER TRUE

## 2022-01-11 SDOH — HEALTH STABILITY: PHYSICAL HEALTH: ON AVERAGE, HOW MANY DAYS PER WEEK DO YOU ENGAGE IN MODERATE TO STRENUOUS EXERCISE (LIKE A BRISK WALK)?: 0 DAYS

## 2022-01-11 SDOH — ECONOMIC STABILITY: FOOD INSECURITY: WITHIN THE PAST 12 MONTHS, THE FOOD YOU BOUGHT JUST DIDN'T LAST AND YOU DIDN'T HAVE MONEY TO GET MORE.: NEVER TRUE

## 2022-01-11 SDOH — ECONOMIC STABILITY: TRANSPORTATION INSECURITY
IN THE PAST 12 MONTHS, HAS THE LACK OF TRANSPORTATION KEPT YOU FROM MEDICAL APPOINTMENTS OR FROM GETTING MEDICATIONS?: NO

## 2022-01-11 SDOH — ECONOMIC STABILITY: INCOME INSECURITY: HOW HARD IS IT FOR YOU TO PAY FOR THE VERY BASICS LIKE FOOD, HOUSING, MEDICAL CARE, AND HEATING?: NOT HARD AT ALL

## 2022-01-11 ASSESSMENT — ENCOUNTER SYMPTOMS
NERVOUS/ANXIOUS: 0
FREQUENCY: 0
SORE THROAT: 0
JOINT SWELLING: 0
ABDOMINAL PAIN: 0
COUGH: 1
HEMATURIA: 0
EYE PAIN: 0
CONSTIPATION: 1
MYALGIAS: 0
HEADACHES: 0
SHORTNESS OF BREATH: 0
HEARTBURN: 0
DIARRHEA: 0
PALPITATIONS: 0
HEMATOCHEZIA: 0
NAUSEA: 0
FEVER: 0
DIZZINESS: 0
DYSURIA: 0
ARTHRALGIAS: 0
WEAKNESS: 0
PARESTHESIAS: 0
BREAST MASS: 0
CHILLS: 0

## 2022-01-11 ASSESSMENT — SOCIAL DETERMINANTS OF HEALTH (SDOH)
DO YOU BELONG TO ANY CLUBS OR ORGANIZATIONS SUCH AS CHURCH GROUPS UNIONS, FRATERNAL OR ATHLETIC GROUPS, OR SCHOOL GROUPS?: NO
IN A TYPICAL WEEK, HOW MANY TIMES DO YOU TALK ON THE PHONE WITH FAMILY, FRIENDS, OR NEIGHBORS?: TWICE A WEEK
HOW OFTEN DO YOU ATTEND CHURCH OR RELIGIOUS SERVICES?: NEVER
HOW OFTEN DO YOU GET TOGETHER WITH FRIENDS OR RELATIVES?: PATIENT DECLINED
ARE YOU MARRIED, WIDOWED, DIVORCED, SEPARATED, NEVER MARRIED, OR LIVING WITH A PARTNER?: NEVER MARRIED

## 2022-01-11 ASSESSMENT — LIFESTYLE VARIABLES
HOW OFTEN DO YOU HAVE A DRINK CONTAINING ALCOHOL: 2-4 TIMES A MONTH
HOW OFTEN DO YOU HAVE SIX OR MORE DRINKS ON ONE OCCASION: NEVER
HOW MANY STANDARD DRINKS CONTAINING ALCOHOL DO YOU HAVE ON A TYPICAL DAY: 1 OR 2

## 2022-01-11 ASSESSMENT — ACTIVITIES OF DAILY LIVING (ADL): CURRENT_FUNCTION: NO ASSISTANCE NEEDED

## 2022-01-14 ENCOUNTER — OFFICE VISIT (OUTPATIENT)
Dept: PEDIATRICS | Facility: CLINIC | Age: 72
End: 2022-01-14
Payer: COMMERCIAL

## 2022-01-14 VITALS
TEMPERATURE: 96.8 F | OXYGEN SATURATION: 99 % | DIASTOLIC BLOOD PRESSURE: 58 MMHG | HEART RATE: 83 BPM | BODY MASS INDEX: 48.58 KG/M2 | WEIGHT: 264 LBS | SYSTOLIC BLOOD PRESSURE: 119 MMHG | HEIGHT: 62 IN

## 2022-01-14 DIAGNOSIS — Z00.00 ROUTINE GENERAL MEDICAL EXAMINATION AT A HEALTH CARE FACILITY: Primary | ICD-10-CM

## 2022-01-14 DIAGNOSIS — I10 ESSENTIAL HYPERTENSION, BENIGN: ICD-10-CM

## 2022-01-14 DIAGNOSIS — Z12.11 SCREEN FOR COLON CANCER: ICD-10-CM

## 2022-01-14 PROCEDURE — 80048 BASIC METABOLIC PNL TOTAL CA: CPT | Performed by: INTERNAL MEDICINE

## 2022-01-14 PROCEDURE — G0438 PPPS, INITIAL VISIT: HCPCS | Performed by: INTERNAL MEDICINE

## 2022-01-14 PROCEDURE — 36415 COLL VENOUS BLD VENIPUNCTURE: CPT | Performed by: INTERNAL MEDICINE

## 2022-01-14 RX ORDER — SPIRONOLACTONE 50 MG/1
50 TABLET, FILM COATED ORAL DAILY
Qty: 90 TABLET | Refills: 3 | Status: SHIPPED | OUTPATIENT
Start: 2022-01-14 | End: 2022-03-04

## 2022-01-14 RX ORDER — LISINOPRIL 5 MG/1
5 TABLET ORAL DAILY
Qty: 90 TABLET | Refills: 3 | Status: SHIPPED | OUTPATIENT
Start: 2022-01-14 | End: 2022-12-30

## 2022-01-14 RX ORDER — ZOSTER VACCINE RECOMBINANT, ADJUVANTED 50 MCG/0.5
KIT INTRAMUSCULAR
COMMUNITY
Start: 2021-04-30 | End: 2023-01-16

## 2022-01-14 ASSESSMENT — ENCOUNTER SYMPTOMS
SORE THROAT: 0
EYE PAIN: 0
DIARRHEA: 0
CHILLS: 0
PALPITATIONS: 0
HEADACHES: 0
DYSURIA: 0
HEMATOCHEZIA: 0
BREAST MASS: 0
FREQUENCY: 0
ABDOMINAL PAIN: 0
SHORTNESS OF BREATH: 0
NERVOUS/ANXIOUS: 0
FEVER: 0
COUGH: 1
HEMATURIA: 0
ARTHRALGIAS: 0
NAUSEA: 0
WEAKNESS: 0
MYALGIAS: 0
JOINT SWELLING: 0
HEARTBURN: 0
PARESTHESIAS: 0
CONSTIPATION: 1
DIZZINESS: 0

## 2022-01-14 ASSESSMENT — ACTIVITIES OF DAILY LIVING (ADL): CURRENT_FUNCTION: NO ASSISTANCE NEEDED

## 2022-01-14 ASSESSMENT — MIFFLIN-ST. JEOR: SCORE: 1673.45

## 2022-01-14 NOTE — PROGRESS NOTES
"SUBJECTIVE:   Lilliana Manning is a 71 year old female who presents for Preventive Visit.     Patient has been advised of split billing requirements and indicates understanding: Yes   Are you in the first 12 months of your Medicare coverage?  No    Healthy Habits:     In general, how would you rate your overall health?  Good    Frequency of exercise:  None    Do you usually eat at least 4 servings of fruit and vegetables a day, include whole grains    & fiber and avoid regularly eating high fat or \"junk\" foods?  Yes    Taking medications regularly:  Yes    Medication side effects:  None    Ability to successfully perform activities of daily living:  No assistance needed    Home Safety:  No safety concerns identified    Hearing Impairment:  No hearing concerns    In the past 6 months, have you been bothered by leaking of urine? Yes    In general, how would you rate your overall mental or emotional health?  Good      PHQ-2 Total Score: 0    Additional concerns today:  No    Patient would like to discuss on going wet cough.     Do you feel safe in your environment? Yes    Have you ever done Advance Care Planning? (For example, a Health Directive, POLST, or a discussion with a medical provider or your loved ones about your wishes): Yes, advance care planning is on file.       Fall risk-  No falls  -  Fallen 2 or more times in the past year?: No  Any fall with injury in the past year?: No  Cognitive Screening   1) Repeat 3 items (Leader, Season, Table)    2) Clock draw: NORMAL  3) 3 item recall: Recalls 3 objects  Results: 3 items recalled: COGNITIVE IMPAIRMENT LESS LIKELY    Mini-CogTM Copyright CORY Cooley. Licensed by the author for use in [facility]; reprinted with permission ([email or web address].[email or web address]). All rights reserved.      Do you have sleep apnea, excessive snoring or daytime drowsiness?: no       Social History     Tobacco Use     Smoking status: Never Smoker     Smokeless tobacco: Never Used   Substance Use " "Topics     Alcohol use: Yes     Alcohol/week: 0.0 standard drinks     Comment: 1 drink weekly     If you drink alcohol do you typically have >3 drinks per day or >7 drinks per week? No    Alcohol Use 1/14/2022   Prescreen: >3 drinks/day or >7 drinks/week? -   Prescreen: >3 drinks/day or >7 drinks/week? No     Current providers sharing in care for this patient include:   Patient Care Team:  Hugh Leon MD as PCP - General  Hugh Leon MD as Assigned PCP    The following health maintenance items are reviewed in Epic and correct as of today:  Health Maintenance Due   Topic Date Due     ANNUAL REVIEW OF HM ORDERS  Never done     DEXA  11/28/2021     COLORECTAL CANCER SCREENING  12/26/2021     FALL RISK ASSESSMENT  01/13/2022     MAMMO SCREENING  01/19/2022       Review of Systems   Constitutional: Negative for chills and fever.   HENT: Negative for congestion, ear pain, hearing loss and sore throat.    Eyes: Negative for pain and visual disturbance.   Respiratory: Positive for cough. Negative for shortness of breath.    Cardiovascular: Negative for chest pain, palpitations and peripheral edema.   Gastrointestinal: Positive for constipation. Negative for abdominal pain, diarrhea, heartburn, hematochezia and nausea.   Breasts:  Negative for tenderness, breast mass and discharge.   Genitourinary: Negative for dysuria, frequency, genital sores, hematuria, pelvic pain, urgency, vaginal bleeding and vaginal discharge.   Musculoskeletal: Negative for arthralgias, joint swelling and myalgias.   Skin: Negative for rash.   Neurological: Negative for dizziness, weakness, headaches and paresthesias.   Psychiatric/Behavioral: Negative for mood changes. The patient is not nervous/anxious.        OBJECTIVE:   /58   Pulse 83   Temp 96.8  F (36  C) (Tympanic)   Ht 1.579 m (5' 2.17\")   Wt 119.7 kg (264 lb)   SpO2 99%   BMI 48.02 kg/m   Estimated body mass index is 48.02 kg/m  as calculated from the following:    " "Height as of this encounter: 1.579 m (5' 2.17\").    Weight as of this encounter: 119.7 kg (264 lb).  Physical Exam  GENERAL: healthy, alert and no distress  EYES: PERRL, EOMI  HENT: ear canals and TM's normal  NECK: no adenopathy  RESP: lungs clear to auscultation - no rales, rhonchi or wheezes  CV: regular rate and rhythm, normal S1 S2, no murmur, no peripheral edema and peripheral pulses strong  ABDOMEN: soft, nontender, bowel sounds normal  MS: no gross musculoskeletal defects noted, no edema  SKIN: no suspicious lesions or rashes  NEURO: Normal strength and tone  PSYCH: mentation appears normal, affect normal/bright       ASSESSMENT / PLAN:       ICD-10-CM    1. Routine general medical examination at a health care facility  Z00.00 Basic metabolic panel   2. Essential hypertension, benign  I10 lisinopril (ZESTRIL) 5 MG tablet     spironolactone (ALDACTONE) 50 MG tablet     Basic metabolic panel   3. Screen for colon cancer  Z12.11 MARCK([employer])    Discussed options for colon cancer screening. Has been opting not to do colonoscopy d/t needing to have done in the hospital due to weight. With significant weight loss, may be able to have as outpatient. For now, [place] ordered.     HTN. Having low BP sx. With weight loss, decrease lisinopril to 5 mg daily.    COUNSELING:  Reviewed preventive health counseling, as reflected in patient instructions    Estimated body mass index is 48.02 kg/m  as calculated from the following:    Height as of this encounter: 1.579 m (5' 2.17\").    Weight as of this encounter: 119.7 kg (264 lb).    Weight management plan: Discussed healthy diet and exercise guidelines    She reports that she has never smoked. She has never used smokeless tobacco.      Appropriate preventive services were discussed with this patient, including applicable screening as appropriate for cardiovascular disease, diabetes, osteopenia/osteoporosis, and glaucoma.  As appropriate for age/gender, " discussed screening for colorectal cancer, breast cancer, and cervical cancer. Checklist reviewing preventive services available has been given to the patient.    Reviewed patients plan of care and provided an AVS. The Basic Care Plan (routine screening as documented in Health Maintenance) for Lilliana meets the Care Plan requirement. This Care Plan has been established and reviewed with the Patient.    Counseling Resources:  ATP IV Guidelines  Pooled Cohorts Equation Calculator  Breast Cancer Risk Calculator  Breast Cancer: Medication to Reduce Risk  FRAX Risk Assessment  ICSI Preventive Guidelines  Dietary Guidelines for Americans, 2010  USDA's MyPlate  ASA Prophylaxis  Lung CA Screening    Hugh Leon MD  [facility]    Identified Health Risks:

## 2022-01-15 LAB
ANION GAP SERPL CALCULATED.3IONS-SCNC: 9 MMOL/L (ref 3–14)
BUN SERPL-MCNC: 35 MG/DL (ref 7–30)
CALCIUM SERPL-MCNC: 9.6 MG/DL (ref 8.5–10.1)
CHLORIDE BLD-SCNC: 106 MMOL/L (ref 94–109)
CO2 SERPL-SCNC: 23 MMOL/L (ref 20–32)
CREAT SERPL-MCNC: 1.6 MG/DL (ref 0.52–1.04)
GFR SERPL CREATININE-BSD FRML MDRD: 34 ML/MIN/1.73M2
GLUCOSE BLD-MCNC: 91 MG/DL (ref 70–99)
POTASSIUM BLD-SCNC: 4.6 MMOL/L (ref 3.4–5.3)
SODIUM SERPL-SCNC: 138 MMOL/L (ref 133–144)

## 2022-01-24 DIAGNOSIS — I10 ESSENTIAL HYPERTENSION, BENIGN: ICD-10-CM

## 2022-01-25 RX ORDER — LISINOPRIL 10 MG/1
TABLET ORAL
Qty: 90 TABLET | Refills: 3 | OUTPATIENT
Start: 2022-01-25

## 2022-01-26 DIAGNOSIS — I10 ESSENTIAL HYPERTENSION, BENIGN: ICD-10-CM

## 2022-01-26 NOTE — TELEPHONE ENCOUNTER
Should have refills  E-Prescribing Status: Receipt confirmed by pharmacy (1/14/2022  2:01 PM CST)  Nat GARCIA RN, BSN

## 2022-01-27 RX ORDER — LISINOPRIL 10 MG/1
TABLET ORAL
Qty: 90 TABLET | Refills: 3 | OUTPATIENT
Start: 2022-01-27

## 2022-01-28 ENCOUNTER — ANCILLARY PROCEDURE (OUTPATIENT)
Dept: MAMMOGRAPHY | Facility: CLINIC | Age: 72
End: 2022-01-28
Attending: INTERNAL MEDICINE
Payer: COMMERCIAL

## 2022-01-28 DIAGNOSIS — Z12.31 VISIT FOR SCREENING MAMMOGRAM: ICD-10-CM

## 2022-01-28 PROCEDURE — 77067 SCR MAMMO BI INCL CAD: CPT | Mod: TC | Performed by: RADIOLOGY

## 2022-01-31 LAB — COLOGUARD-ABSTRACT: NEGATIVE

## 2022-02-04 ENCOUNTER — LAB (OUTPATIENT)
Dept: LAB | Facility: CLINIC | Age: 72
End: 2022-02-04
Payer: COMMERCIAL

## 2022-02-04 DIAGNOSIS — R79.89 ELEVATED SERUM CREATININE: ICD-10-CM

## 2022-02-04 PROCEDURE — 36415 COLL VENOUS BLD VENIPUNCTURE: CPT

## 2022-02-04 PROCEDURE — 80048 BASIC METABOLIC PNL TOTAL CA: CPT

## 2022-02-05 LAB
ANION GAP SERPL CALCULATED.3IONS-SCNC: 5 MMOL/L (ref 3–14)
BUN SERPL-MCNC: 35 MG/DL (ref 7–30)
CALCIUM SERPL-MCNC: 9.7 MG/DL (ref 8.5–10.1)
CHLORIDE BLD-SCNC: 107 MMOL/L (ref 94–109)
CO2 SERPL-SCNC: 27 MMOL/L (ref 20–32)
CREAT SERPL-MCNC: 1.96 MG/DL (ref 0.52–1.04)
GFR SERPL CREATININE-BSD FRML MDRD: 27 ML/MIN/1.73M2
GLUCOSE BLD-MCNC: 101 MG/DL (ref 70–99)
POTASSIUM BLD-SCNC: 4.6 MMOL/L (ref 3.4–5.3)
SODIUM SERPL-SCNC: 139 MMOL/L (ref 133–144)

## 2022-02-28 ENCOUNTER — DOCUMENTATION ONLY (OUTPATIENT)
Dept: LAB | Facility: CLINIC | Age: 72
End: 2022-02-28
Payer: COMMERCIAL

## 2022-02-28 DIAGNOSIS — R79.89 ELEVATED SERUM CREATININE: Primary | ICD-10-CM

## 2022-03-03 ENCOUNTER — MEDICAL CORRESPONDENCE (OUTPATIENT)
Dept: HEALTH INFORMATION MANAGEMENT | Facility: CLINIC | Age: 72
End: 2022-03-03
Payer: COMMERCIAL

## 2022-03-04 ENCOUNTER — ALLIED HEALTH/NURSE VISIT (OUTPATIENT)
Dept: PEDIATRICS | Facility: CLINIC | Age: 72
End: 2022-03-04
Payer: COMMERCIAL

## 2022-03-04 ENCOUNTER — LAB (OUTPATIENT)
Dept: LAB | Facility: CLINIC | Age: 72
End: 2022-03-04

## 2022-03-04 VITALS — OXYGEN SATURATION: 100 % | DIASTOLIC BLOOD PRESSURE: 62 MMHG | SYSTOLIC BLOOD PRESSURE: 120 MMHG | HEART RATE: 72 BPM

## 2022-03-04 DIAGNOSIS — I10 ESSENTIAL HYPERTENSION, BENIGN: Primary | ICD-10-CM

## 2022-03-04 DIAGNOSIS — R79.89 ELEVATED SERUM CREATININE: ICD-10-CM

## 2022-03-04 PROCEDURE — 36415 COLL VENOUS BLD VENIPUNCTURE: CPT

## 2022-03-04 PROCEDURE — 99207 PR NO CHARGE NURSE ONLY: CPT

## 2022-03-04 PROCEDURE — 80048 BASIC METABOLIC PNL TOTAL CA: CPT

## 2022-03-04 NOTE — PROGRESS NOTES
"Lilliana Manning is a 71 year old patient who comes in today for a Blood Pressure check.  Initial BP:  /62 (BP Location: Left arm, Patient Position: Sitting, Cuff Size: Adult Large)   Pulse 72   SpO2 100%      72  Disposition: provider notified while patient in the clinic    Pt stopped spironolactone 50 mg once a day, taking only lisinopril 5 mg in the morning. Checks BP twice a day(morning BP check is prior to lisinopril intake). Pt brought the BP log from the last 2 months. Pt had BMP done this morning as well.      reviewed the BP log & today's vitals while pt was in clinic. Advised her to continue lisinopril 5mg daily, check blood pressure prn. Pt was notified as per 's recommendation & discontinued spironolactone from med list as per 's request.     P.S: Pt is on \"modified Hang diet\" plan since last Aug. Today's weight is 257.5#. In this diet plan, she can't have fruits or fruit juices. So, stopped drinking orange juice & any fruits. Because of her recent creatinine elevation, she avoided citrus fruits as well. Now she restarted drinking 4 oz of orange juice every morning.     Pt brought her home BP monitor to recheck. At 9:20 am her BP was 131/75(L arm), after resting(9:30 am) her BP was 119/65(L arm).    Alexandria RN  Patient Advocate Liason (PAL)  [facility]              "

## 2022-03-05 LAB
ANION GAP SERPL CALCULATED.3IONS-SCNC: 8 MMOL/L (ref 3–14)
BUN SERPL-MCNC: 22 MG/DL (ref 7–30)
CALCIUM SERPL-MCNC: 9.5 MG/DL (ref 8.5–10.1)
CHLORIDE BLD-SCNC: 106 MMOL/L (ref 94–109)
CO2 SERPL-SCNC: 26 MMOL/L (ref 20–32)
CREAT SERPL-MCNC: 1.4 MG/DL (ref 0.52–1.04)
GFR SERPL CREATININE-BSD FRML MDRD: 40 ML/MIN/1.73M2
GLUCOSE BLD-MCNC: 98 MG/DL (ref 70–99)
POTASSIUM BLD-SCNC: 4.1 MMOL/L (ref 3.4–5.3)
SODIUM SERPL-SCNC: 140 MMOL/L (ref 133–144)

## 2022-10-15 ENCOUNTER — HEALTH MAINTENANCE LETTER (OUTPATIENT)
Age: 72
End: 2022-10-15

## 2022-12-30 DIAGNOSIS — I10 ESSENTIAL HYPERTENSION, BENIGN: ICD-10-CM

## 2022-12-30 RX ORDER — LISINOPRIL 5 MG/1
TABLET ORAL
Qty: 90 TABLET | Refills: 4 | Status: SHIPPED | OUTPATIENT
Start: 2022-12-30 | End: 2024-01-22

## 2022-12-30 NOTE — TELEPHONE ENCOUNTER
Routing refill request to provider for review/approval because:  Labs out of range:  creatinine  Creatinine   Date Value Ref Range Status   03/04/2022 1.40 (H) 0.52 - 1.04 mg/dL Final   01/13/2021 1.14 (H) 0.52 - 1.04 mg/dL Final       Laney Enriquez RN

## 2023-01-09 SDOH — HEALTH STABILITY: PHYSICAL HEALTH: ON AVERAGE, HOW MANY MINUTES DO YOU ENGAGE IN EXERCISE AT THIS LEVEL?: 0 MIN

## 2023-01-09 SDOH — HEALTH STABILITY: PHYSICAL HEALTH: ON AVERAGE, HOW MANY DAYS PER WEEK DO YOU ENGAGE IN MODERATE TO STRENUOUS EXERCISE (LIKE A BRISK WALK)?: 0 DAYS

## 2023-01-09 ASSESSMENT — ENCOUNTER SYMPTOMS
SORE THROAT: 0
HEADACHES: 0
JOINT SWELLING: 0
CHILLS: 0
CONSTIPATION: 0
HEARTBURN: 0
ARTHRALGIAS: 1
COUGH: 0
PALPITATIONS: 0
HEMATURIA: 0
EYE PAIN: 0
NERVOUS/ANXIOUS: 0
HEMATOCHEZIA: 0
PARESTHESIAS: 0
DIARRHEA: 0
FEVER: 0
WEAKNESS: 0
DIZZINESS: 0
NAUSEA: 0
ABDOMINAL PAIN: 0
BREAST MASS: 0
DYSURIA: 0
SHORTNESS OF BREATH: 0
FREQUENCY: 0
MYALGIAS: 0

## 2023-01-09 ASSESSMENT — ACTIVITIES OF DAILY LIVING (ADL): CURRENT_FUNCTION: NO ASSISTANCE NEEDED

## 2023-01-16 ENCOUNTER — OFFICE VISIT (OUTPATIENT)
Dept: PEDIATRICS | Facility: CLINIC | Age: 73
End: 2023-01-16
Payer: COMMERCIAL

## 2023-01-16 VITALS
TEMPERATURE: 97.1 F | DIASTOLIC BLOOD PRESSURE: 68 MMHG | OXYGEN SATURATION: 98 % | SYSTOLIC BLOOD PRESSURE: 122 MMHG | HEIGHT: 62 IN | HEART RATE: 66 BPM | WEIGHT: 224 LBS | BODY MASS INDEX: 41.22 KG/M2 | RESPIRATION RATE: 14 BRPM

## 2023-01-16 DIAGNOSIS — Z00.00 ROUTINE GENERAL MEDICAL EXAMINATION AT A HEALTH CARE FACILITY: Primary | ICD-10-CM

## 2023-01-16 DIAGNOSIS — E66.01 MORBID OBESITY (H): ICD-10-CM

## 2023-01-16 DIAGNOSIS — I10 ESSENTIAL HYPERTENSION, BENIGN: ICD-10-CM

## 2023-01-16 DIAGNOSIS — Z78.0 POST-MENOPAUSE: ICD-10-CM

## 2023-01-16 LAB
ANION GAP SERPL CALCULATED.3IONS-SCNC: 15 MMOL/L (ref 7–15)
BUN SERPL-MCNC: 25 MG/DL (ref 8–23)
CALCIUM SERPL-MCNC: 10 MG/DL (ref 8.8–10.2)
CHLORIDE SERPL-SCNC: 106 MMOL/L (ref 98–107)
CHOLEST SERPL-MCNC: 214 MG/DL
CREAT SERPL-MCNC: 1.22 MG/DL (ref 0.51–0.95)
DEPRECATED HCO3 PLAS-SCNC: 24 MMOL/L (ref 22–29)
GFR SERPL CREATININE-BSD FRML MDRD: 47 ML/MIN/1.73M2
GLUCOSE SERPL-MCNC: 97 MG/DL (ref 70–99)
HDLC SERPL-MCNC: 87 MG/DL
LDLC SERPL CALC-MCNC: 114 MG/DL
NONHDLC SERPL-MCNC: 127 MG/DL
POTASSIUM SERPL-SCNC: 4.8 MMOL/L (ref 3.4–5.3)
SODIUM SERPL-SCNC: 145 MMOL/L (ref 136–145)
TRIGL SERPL-MCNC: 63 MG/DL

## 2023-01-16 PROCEDURE — 99397 PER PM REEVAL EST PAT 65+ YR: CPT | Performed by: INTERNAL MEDICINE

## 2023-01-16 PROCEDURE — 36415 COLL VENOUS BLD VENIPUNCTURE: CPT | Performed by: INTERNAL MEDICINE

## 2023-01-16 PROCEDURE — 80061 LIPID PANEL: CPT | Performed by: INTERNAL MEDICINE

## 2023-01-16 PROCEDURE — 80048 BASIC METABOLIC PNL TOTAL CA: CPT | Performed by: INTERNAL MEDICINE

## 2023-01-16 ASSESSMENT — ENCOUNTER SYMPTOMS
DIZZINESS: 0
NERVOUS/ANXIOUS: 0
SHORTNESS OF BREATH: 0
CONSTIPATION: 0
DIARRHEA: 0
SORE THROAT: 0
MYALGIAS: 0
NAUSEA: 0
FREQUENCY: 0
HEADACHES: 0
EYE PAIN: 0
FEVER: 0
ABDOMINAL PAIN: 0
PARESTHESIAS: 0
BREAST MASS: 0
PALPITATIONS: 0
JOINT SWELLING: 0
HEMATOCHEZIA: 0
HEMATURIA: 0
ARTHRALGIAS: 1
WEAKNESS: 0
COUGH: 0
CHILLS: 0
HEARTBURN: 0
DYSURIA: 0

## 2023-01-16 ASSESSMENT — ACTIVITIES OF DAILY LIVING (ADL): CURRENT_FUNCTION: NO ASSISTANCE NEEDED

## 2023-01-16 NOTE — PROGRESS NOTES
"SUBJECTIVE:   Davida is a 72 year old who presents for Preventive Visit.    Patient has been advised of split billing requirements and indicates understanding: Yes  Are you in the first 12 months of your Medicare coverage?  No    Healthy Habits:     In general, how would you rate your overall health?  Good    Frequency of exercise:  1 day/week    Duration of exercise:  Less than 15 minutes    Do you usually eat at least 4 servings of fruit and vegetables a day, include whole grains    & fiber and avoid regularly eating high fat or \"junk\" foods?  Yes    Taking medications regularly:  Yes    Medication side effects:  None    Ability to successfully perform activities of daily living:  No assistance needed    Home Safety:  No safety concerns identified    Hearing Impairment:  No hearing concerns    In the past 6 months, have you been bothered by leaking of urine?  No    In general, how would you rate your overall mental or emotional health?  Good      PHQ-2 Total Score: 0    Additional concerns today:  No    Snoring. From her smart watch getting only 45 minutes of deep sleep per night.   Concerns for possible TEO.   Does not typically fall asleep or nap. Typically feels fairly refreshed on waking.  Discussed possible sleep referral, she will consider.       Obesity. Having great success with weight loss.   Wt Readings from Last 4 Encounters:   01/16/23 101.6 kg (224 lb)   01/14/22 119.7 kg (264 lb)   01/13/21 147.4 kg (325 lb)   01/06/20 (!) 153.8 kg (339 lb)   BMI remains >40, indicating morbid obesity    HTN. Treated with lisinopril.  BP Readings from Last 3 Encounters:   01/16/23 122/68   03/04/22 120/62   01/14/22 119/58           Have you ever done Advance Care Planning? (For example, a Health Directive, POLST, or a discussion with a medical provider or your loved ones about your wishes): Yes, advance care planning is on file.       Fall risk  Fallen 2 or more times in the past year?: No  Any fall with injury in the " past year?: No    Cognitive Screening   1) Repeat 3 items (Leader, Season, Table)    2) Clock draw: NORMAL  3) 3 item recall: Recalls 3 objects  Results: 3 items recalled: COGNITIVE IMPAIRMENT LESS LIKELY    Mini-CogTM Copyright S Lenora. Licensed by the author for use in [facility]; reprinted with permission (josi@[facility]). All rights reserved.      Do you have sleep apnea, excessive snoring or daytime drowsiness?: no     Social History     Tobacco Use     Smoking status: Never     Smokeless tobacco: Never   Substance Use Topics     Alcohol use: Yes     Comment: 1 to 2 per month         Alcohol Use 1/9/2023   Prescreen: >3 drinks/day or >7 drinks/week? No   Prescreen: >3 drinks/day or >7 drinks/week? -         Current providers sharing in care for this patient include:  Patient Care Team:  Hugh Leon MD as PCP - General  Hugh Leon MD as Assigned PCP    The following health maintenance items are reviewed in Epic and correct as of today:  Health Maintenance   Topic Date Due     ANNUAL REVIEW OF HM ORDERS  Never done     DEXA  11/28/2021     MEDICARE ANNUAL WELLNESS VISIT  01/14/2023     MAMMO SCREENING  01/28/2023     LIPID  11/28/2023     FALL RISK ASSESSMENT  01/16/2024     COLORECTAL CANCER SCREENING  01/31/2025     DTAP/TDAP/TD IMMUNIZATION (4 - Td or Tdap) 08/07/2027     ADVANCE CARE PLANNING  01/16/2028     PHQ-2 (once per calendar year)  Completed     INFLUENZA VACCINE  Completed     Pneumococcal Vaccine: 65+ Years  Completed     ZOSTER IMMUNIZATION  Completed     COVID-19 Vaccine  Completed     HEPATITIS C SCREENING  Addressed     IPV IMMUNIZATION  Aged Out     MENINGITIS IMMUNIZATION  Aged Out       Review of Systems   Constitutional: Negative for chills and fever.   HENT: Negative for congestion, ear pain, hearing loss and sore throat.    Eyes: Negative for pain and visual disturbance.   Respiratory: Negative for cough and shortness of breath.    Cardiovascular: Negative for chest  "pain, palpitations and peripheral edema.   Gastrointestinal: Negative for abdominal pain, constipation, diarrhea, heartburn, hematochezia and nausea.   Breasts:  Negative for tenderness, breast mass and discharge.   Genitourinary: Negative for dysuria, frequency, genital sores, hematuria, pelvic pain, urgency, vaginal bleeding and vaginal discharge.   Musculoskeletal: Positive for arthralgias. Negative for joint swelling and myalgias.   Skin: Negative for rash.   Neurological: Negative for dizziness, weakness, headaches and paresthesias.   Psychiatric/Behavioral: Negative for mood changes. The patient is not nervous/anxious.        OBJECTIVE:   /68   Pulse 66   Temp 97.1  F (36.2  C) (Temporal)   Resp 14   Ht 1.575 m (5' 2\")   Wt 101.6 kg (224 lb)   SpO2 98%   BMI 40.97 kg/m   Estimated body mass index is 40.97 kg/m  as calculated from the following:    Height as of this encounter: 1.575 m (5' 2\").    Weight as of this encounter: 101.6 kg (224 lb).  Physical Exam  GENERAL: healthy, alert and no distress  EYES: PERRL, EOMI  HENT: ear canals and TM's normal  NECK: no adenopathy  RESP: lungs clear to auscultation - no rales, rhonchi or wheezes  CV: regular rate and rhythm, normal S1 S2, 1-2/6 systolic murmur loudest RUSB, no peripheral edema and peripheral pulses strong  ABDOMEN: soft, nontender, bowel sounds normal  MS: no gross musculoskeletal defects noted, no edema  SKIN: no suspicious lesions or rashes  NEURO: Normal strength and tone  PSYCH: mentation appears normal, affect normal/bright        ASSESSMENT / PLAN:       ICD-10-CM    1. Routine general medical examination at a health care facility  Z00.00 Basic metabolic panel     Lipid panel reflex to direct LDL Fasting      2. Essential hypertension, benign  I10       3. Post-menopause  Z78.0 DEXA HIP/PELVIS/SPINE - Future      4. Morbid obesity (H)  E66.01          HTN. BP is well controlled. W/ ongoing weight loss, she may be able to stop " lisinopril.    Obesity. Excellent job losing weight.       COUNSELING:  Reviewed preventive health counseling, as reflected in patient instructions        She reports that she has never smoked. She has never used smokeless tobacco.      Appropriate preventive services were discussed with this patient, including applicable screening as appropriate for cardiovascular disease, diabetes, osteopenia/osteoporosis, and glaucoma.  As appropriate for age/gender, discussed screening for colorectal cancer, prostate cancer, breast cancer, and cervical cancer. Checklist reviewing preventive services available has been given to the patient.    Reviewed patients plan of care and provided an AVS. The Basic Care Plan (routine screening as documented in Health Maintenance) for Lilliana meets the Care Plan requirement. This Care Plan has been established and reviewed with the Patient.      Hugh Leon MD  [facility]    Identified Health Risks:

## 2023-01-19 ENCOUNTER — ANCILLARY PROCEDURE (OUTPATIENT)
Dept: BONE DENSITY | Facility: CLINIC | Age: 73
End: 2023-01-19
Payer: COMMERCIAL

## 2023-01-19 DIAGNOSIS — Z78.0 POST-MENOPAUSE: ICD-10-CM

## 2023-01-19 PROCEDURE — 77080 DXA BONE DENSITY AXIAL: CPT | Performed by: INTERNAL MEDICINE

## 2023-02-02 ENCOUNTER — ANCILLARY PROCEDURE (OUTPATIENT)
Dept: MAMMOGRAPHY | Facility: CLINIC | Age: 73
End: 2023-02-02
Attending: INTERNAL MEDICINE
Payer: COMMERCIAL

## 2023-02-02 DIAGNOSIS — Z12.31 VISIT FOR SCREENING MAMMOGRAM: ICD-10-CM

## 2023-02-02 PROCEDURE — 77067 SCR MAMMO BI INCL CAD: CPT | Mod: TC | Performed by: RADIOLOGY

## 2023-07-17 ENCOUNTER — OFFICE VISIT (OUTPATIENT)
Dept: FAMILY MEDICINE | Facility: CLINIC | Age: 73
End: 2023-07-17
Payer: COMMERCIAL

## 2023-07-17 VITALS
RESPIRATION RATE: 16 BRPM | DIASTOLIC BLOOD PRESSURE: 72 MMHG | BODY MASS INDEX: 36.62 KG/M2 | HEART RATE: 75 BPM | HEIGHT: 62 IN | TEMPERATURE: 98.5 F | OXYGEN SATURATION: 99 % | SYSTOLIC BLOOD PRESSURE: 120 MMHG | WEIGHT: 199 LBS

## 2023-07-17 DIAGNOSIS — H26.9 CATARACT OF BOTH EYES, UNSPECIFIED CATARACT TYPE: ICD-10-CM

## 2023-07-17 DIAGNOSIS — I10 ESSENTIAL HYPERTENSION, BENIGN: ICD-10-CM

## 2023-07-17 DIAGNOSIS — Z01.818 PRE-OPERATIVE EXAMINATION: Primary | ICD-10-CM

## 2023-07-17 PROCEDURE — 93000 ELECTROCARDIOGRAM COMPLETE: CPT | Performed by: NURSE PRACTITIONER

## 2023-07-17 PROCEDURE — 99214 OFFICE O/P EST MOD 30 MIN: CPT | Performed by: NURSE PRACTITIONER

## 2023-07-17 ASSESSMENT — PAIN SCALES - GENERAL: PAINLEVEL: NO PAIN (0)

## 2023-07-17 NOTE — PROGRESS NOTES
[facility]  [address] [address] [address]  Phone: [phone]  Primary Provider: Hugh Leon  Pre-op Performing Provider: HAASE, SUSAN RACHELE  56}  PREOPERATIVE EVALUATION:  Today's date: 7/17/2023    Lilliana Manning is a 72 year old female who presents for a preoperative evaluation w/ MAC Anesthesia.      7/17/2023     1:11 PM   Additional Questions   Roomed by Nery Meyers CMA   Accompanied by Self     Surgical Information:  Surgery/Procedure: Bilateral Cataracts (Right First)  Surgery Location: [facility]  Surgeon: Dr Bahena  Surgery Date: 07/26 & 08/02  Time of Surgery: AM - TBD  Where patient plans to recover: At home alone  Fax number for surgical facility: [phone]    Assessment & Plan     The proposed surgical procedure is considered LOW risk.    Pre-operative examination    - EKG 12-lead complete w/read - Clinics    Cataract of both eyes, unspecified cataract type    Essential hypertension, benign:  /72, well controlled.  Hold lisinopril the morning of surgery.              - No identified additional risk factors other than previously addressed    Antiplatelet or Anticoagulation Medication Instructions:   - Patient is on no antiplatelet or anticoagulation medications.    Additional Medication Instructions:   - ACE/ARB: HOLD on day of surgery (minimum 11 hours for general anesthesia).    RECOMMENDATION:  APPROVAL GIVEN to proceed with proposed procedure, without further diagnostic evaluation.        Subjective       HPI related to upcoming procedure: bilateral decrease in vision due to cataract.        7/13/2023     7:42 PM   Preop Questions   1. Have you ever had a heart attack or stroke? No   2. Have you ever had surgery on your heart or blood vessels, such as a stent placement, a coronary artery bypass, or surgery on an artery in your head, neck, heart, or legs? No   3. Do you have chest pain with activity? No   4. Do you have a history of  heart  failure? No   5. Do you currently have a cold, bronchitis or symptoms of other infection? No   6. Do you have a cough, shortness of breath, or wheezing? No   7. Do you or anyone in your family have previous history of blood clots? No   8. Do you or does anyone in your family have a serious bleeding problem such as prolonged bleeding following surgeries or cuts? No   9. Have you ever had problems with anemia or been told to take iron pills? No   10. Have you had any abnormal blood loss such as black, tarry or bloody stools, or abnormal vaginal bleeding? No   11. Have you ever had a blood transfusion? No   12. Are you willing to have a blood transfusion if it is medically needed before, during, or after your surgery? Yes   13. Have you or any of your relatives ever had problems with anesthesia? YES - sister, excessive vomiting   14. Do you have sleep apnea, excessive snoring or daytime drowsiness? No   15. Do you have any artifical heart valves or other implanted medical devices like a pacemaker, defibrillator, or continuous glucose monitor? No   16. Do you have artificial joints? No   17. Are you allergic to latex? No       Health Care Directive:  Patient has a Health Care Directive on file      Preoperative Review of :   reviewed - no record of controlled substances prescribed.      Status of Chronic Conditions:  HYPERTENSION - Patient has longstanding history of HTN , currently denies any symptoms referable to elevated blood pressure. Specifically denies chest pain, palpitations, dyspnea, orthopnea, PND or peripheral edema. Blood pressure readings have been in normal range. Current medication regimen is as listed below. Patient denies any side effects of medication.       Review of Systems  CONSTITUTIONAL: NEGATIVE for fever, chills, change in weight  INTEGUMENTARY/SKIN: NEGATIVE for worrisome rashes, moles or lesions  EYES: NEGATIVE for vision changes or irritation  ENT/MOUTH: NEGATIVE for ear, mouth and  throat problems  RESP: NEGATIVE for significant cough or SOB  CV: NEGATIVE for chest pain, palpitations or peripheral edema  GI: NEGATIVE for nausea, abdominal pain, heartburn, or change in bowel habits  : NEGATIVE for frequency, dysuria, or hematuria  MUSCULOSKELETAL: NEGATIVE for significant arthralgias or myalgia  NEURO: NEGATIVE for weakness, dizziness or paresthesias  ENDOCRINE: NEGATIVE for temperature intolerance, skin/hair changes  HEME: NEGATIVE for bleeding problems  PSYCHIATRIC: NEGATIVE for changes in mood or affect    Patient Active Problem List    Diagnosis Date Noted     Systolic murmur 08/07/2017     Priority: Medium     CARDIOVASCULAR SCREENING; LDL GOAL LESS THAN 160 02/10/2010     Priority: Medium     Obesity (H) 06/27/2007     Priority: Medium     Essential hypertension, benign 06/08/2007     Priority: Medium      Past Medical History:   Diagnosis Date     Hypertension      Intestinal disaccharidase deficiencies and disaccharide malabsorption      Preglaucoma, unspecified      Past Surgical History:   Procedure Laterality Date     ARTHROSCOPY KNEE Right 01/19/2015    Procedure: ARTHROSCOPY KNEE;  Surgeon: Ozzie Mcclure MD;  Location: [facility] OR     BIOPSY  2018 ?     CHOLECYSTECTOMY       DENTAL SURGERY       ZZC NONSPECIFIC PROCEDURE      Cholecystectomy     Current Outpatient Medications   Medication Sig Dispense Refill     calcium-vitamin D (CALTRATE) 600-400 MG-UNIT per tablet Take 1 tablet by mouth 2 times daily       Cholecalciferol (VITAMIN D3) 2000 units CAPS        co-enzyme Q-10 100 MG CAPS capsule Take by mouth daily       cyanocobalamin (VITAMIN B-12) 500 MCG tablet Take 1 tablet (500 mcg) by mouth daily       Lactobacillus (PROBIOTIC ACIDOPHILUS PO)        lisinopril (ZESTRIL) 5 MG tablet TAKE 1 TABLET BY MOUTH EVERY DAY 90 tablet 4     multivitamin w/minerals (THERA-VIT-M) tablet Take 1 tablet by mouth daily 100 tablet 3     vitamin C (ASCORBIC ACID) 1000 MG TABS Take 1,000  "mg by mouth daily         No Known Allergies     Social History     Tobacco Use     Smoking status: Never     Smokeless tobacco: Never   Substance Use Topics     Alcohol use: Yes     Comment: 1 to 2 per month       History   Drug Use No         Objective     /72   Pulse 75   Temp 98.5  F (36.9  C) (Oral)   Resp 16   Ht 1.562 m (5' 1.5\")   Wt 90.3 kg (199 lb)   SpO2 99%   BMI 36.99 kg/m      Physical Exam    GENERAL APPEARANCE: healthy, alert and no distress     EYES: EOMI, PERRL     HENT: ear canals and TM's normal and nose and mouth without ulcers or lesions     NECK: no adenopathy, no asymmetry, masses, or scars and thyroid normal to palpation     RESP: lungs clear to auscultation - no rales, rhonchi or wheezes     CV: regular rates and rhythm, normal S1 S2, no S3 or S4 and no murmur, click or rub     ABDOMEN:  soft, nontender, no HSM or masses and bowel sounds normal     MS: extremities normal- no gross deformities noted, no evidence of inflammation in joints, FROM in all extremities.     SKIN: no suspicious lesions or rashes     NEURO: Normal strength and tone, sensory exam grossly normal, mentation intact and speech normal     PSYCH: mentation appears normal. and affect normal/bright     LYMPHATICS: No cervical adenopathy    Recent Labs   Lab Test 01/16/23  0808 03/04/22  0902    140   POTASSIUM 4.8 4.1   CR 1.22* 1.40*        Diagnostics:  No labs were ordered during this visit.   EKG: appears normal, NSR, normal axis, normal intervals, no acute ST/T changes c/w ischemia, no LVH by voltage criteria, unchanged from previous tracings    Revised Cardiac Risk Index (RCRI):  The patient has the following serious cardiovascular risks for perioperative complications:   - No serious cardiac risks = 0 points     RCRI Interpretation: 0 points: Class I (very low risk - 0.4% complication rate)           Signed Electronically by: Susan Haase, APRN CNP  Copy of this evaluation report is provided to " requesting physician.

## 2024-01-15 ASSESSMENT — ENCOUNTER SYMPTOMS
ARTHRALGIAS: 0
EYE PAIN: 0
JOINT SWELLING: 0
HEARTBURN: 0
SHORTNESS OF BREATH: 0
ABDOMINAL PAIN: 0
PALPITATIONS: 0
DIARRHEA: 0
NAUSEA: 0
PARESTHESIAS: 0
FREQUENCY: 0
FEVER: 0
WEAKNESS: 0
DIZZINESS: 0
SORE THROAT: 0
CONSTIPATION: 0
HEMATURIA: 0
DYSURIA: 0
HEMATOCHEZIA: 0
COUGH: 0
MYALGIAS: 0
HEADACHES: 0
NERVOUS/ANXIOUS: 0
CHILLS: 0
BREAST MASS: 0

## 2024-01-15 ASSESSMENT — ACTIVITIES OF DAILY LIVING (ADL): CURRENT_FUNCTION: NO ASSISTANCE NEEDED

## 2024-01-22 ENCOUNTER — OFFICE VISIT (OUTPATIENT)
Dept: PEDIATRICS | Facility: CLINIC | Age: 74
End: 2024-01-22
Payer: COMMERCIAL

## 2024-01-22 VITALS
BODY MASS INDEX: 35.94 KG/M2 | RESPIRATION RATE: 18 BRPM | WEIGHT: 195.3 LBS | HEART RATE: 70 BPM | TEMPERATURE: 97.1 F | DIASTOLIC BLOOD PRESSURE: 66 MMHG | OXYGEN SATURATION: 97 % | SYSTOLIC BLOOD PRESSURE: 124 MMHG | HEIGHT: 62 IN

## 2024-01-22 DIAGNOSIS — E66.812 CLASS 2 SEVERE OBESITY DUE TO EXCESS CALORIES WITH SERIOUS COMORBIDITY AND BODY MASS INDEX (BMI) OF 36.0 TO 36.9 IN ADULT (H): ICD-10-CM

## 2024-01-22 DIAGNOSIS — N18.31 STAGE 3A CHRONIC KIDNEY DISEASE (H): ICD-10-CM

## 2024-01-22 DIAGNOSIS — Z00.00 ROUTINE GENERAL MEDICAL EXAMINATION AT A HEALTH CARE FACILITY: Primary | ICD-10-CM

## 2024-01-22 DIAGNOSIS — E66.01 CLASS 2 SEVERE OBESITY DUE TO EXCESS CALORIES WITH SERIOUS COMORBIDITY AND BODY MASS INDEX (BMI) OF 36.0 TO 36.9 IN ADULT (H): ICD-10-CM

## 2024-01-22 DIAGNOSIS — I10 ESSENTIAL HYPERTENSION, BENIGN: ICD-10-CM

## 2024-01-22 LAB
ANION GAP SERPL CALCULATED.3IONS-SCNC: 11 MMOL/L (ref 7–15)
BUN SERPL-MCNC: 23.5 MG/DL (ref 8–23)
CALCIUM SERPL-MCNC: 9.6 MG/DL (ref 8.8–10.2)
CHLORIDE SERPL-SCNC: 105 MMOL/L (ref 98–107)
CREAT SERPL-MCNC: 1.11 MG/DL (ref 0.51–0.95)
DEPRECATED HCO3 PLAS-SCNC: 26 MMOL/L (ref 22–29)
EGFRCR SERPLBLD CKD-EPI 2021: 53 ML/MIN/1.73M2
GLUCOSE SERPL-MCNC: 88 MG/DL (ref 70–99)
POTASSIUM SERPL-SCNC: 4.1 MMOL/L (ref 3.4–5.3)
SODIUM SERPL-SCNC: 142 MMOL/L (ref 135–145)

## 2024-01-22 PROCEDURE — 36415 COLL VENOUS BLD VENIPUNCTURE: CPT | Performed by: INTERNAL MEDICINE

## 2024-01-22 PROCEDURE — 80048 BASIC METABOLIC PNL TOTAL CA: CPT | Performed by: INTERNAL MEDICINE

## 2024-01-22 PROCEDURE — 99397 PER PM REEVAL EST PAT 65+ YR: CPT | Performed by: INTERNAL MEDICINE

## 2024-01-22 RX ORDER — LISINOPRIL 5 MG/1
5 TABLET ORAL DAILY
Qty: 90 TABLET | Refills: 4 | Status: SHIPPED | OUTPATIENT
Start: 2024-01-22

## 2024-01-22 ASSESSMENT — ENCOUNTER SYMPTOMS
FREQUENCY: 0
NAUSEA: 0
CONSTIPATION: 0
DYSURIA: 0
ARTHRALGIAS: 0
ABDOMINAL PAIN: 0
PALPITATIONS: 0
DIZZINESS: 0
CHILLS: 0
WEAKNESS: 0
HEMATURIA: 0
SORE THROAT: 0
MYALGIAS: 0
DIARRHEA: 0
HEADACHES: 0
FEVER: 0
NERVOUS/ANXIOUS: 0
SHORTNESS OF BREATH: 0
EYE PAIN: 0
COUGH: 0
JOINT SWELLING: 0

## 2024-01-22 ASSESSMENT — ACTIVITIES OF DAILY LIVING (ADL): CURRENT_FUNCTION: NO ASSISTANCE NEEDED

## 2024-01-22 ASSESSMENT — PAIN SCALES - GENERAL: PAINLEVEL: NO PAIN (0)

## 2024-01-22 NOTE — PATIENT INSTRUCTIONS
Patient Education   Personalized Prevention Plan  You are due for the preventive services outlined below.  Your care team is available to assist you in scheduling these services.  If you have already completed any of these items, please share that information with your care team to update in your medical record.  Health Maintenance Due   Topic Date Due     RSV VACCINE (Pregnancy & 60+) (1 - 1-dose 60+ series) Never done     Mammogram  02/02/2024     Bladder Training: Care Instructions  Your Care Instructions     Bladder training is used to treat urge incontinence and stress incontinence. Urge incontinence means that the need to urinate comes on so fast that you can't get to a toilet in time. Stress incontinence means that you leak urine because of pressure on your bladder. For example, it may happen when you laugh, cough, or lift something heavy.  Bladder training can increase how long you can wait before you have to urinate. It can also help your bladder hold more urine. And it can give you better control over the urge to urinate.  It is important to remember that bladder training takes a few weeks to a few months to make a difference. You may not see results right away, but don't give up.  Follow-up care is a key part of your treatment and safety. Be sure to make and go to all appointments, and call your doctor if you are having problems. It's also a good idea to know your test results and keep a list of the medicines you take.  How can you care for yourself at home?  Work with your doctor to come up with a bladder training program that is right for you. You may use one or more of the following methods.  Delayed urination  In the beginning, try to keep from urinating for 5 minutes after you first feel the need to go.  While you wait, take deep, slow breaths to relax. Kegel exercises can also help you delay the need to go to the bathroom.  After some practice, when you can easily wait 5 minutes to urinate, try to  "wait 10 minutes before you urinate.  Slowly increase the waiting period until you are able to control when you have to urinate.  Scheduled urination  Empty your bladder when you first wake up in the morning.  Schedule times throughout the day when you will urinate.  Start by going to the bathroom every hour, even if you don't need to go.  Slowly increase the time between trips to the bathroom.  When you have found a schedule that works well for you, keep doing it.  If you wake up during the night and have to urinate, do it. Apply your schedule to waking hours only.  Kegel exercises  These tighten and strengthen pelvic muscles, which can help you control the flow of urine. (If doing these exercises causes pain, stop doing them and talk with your doctor.) To do Kegel exercises:  Squeeze your muscles as if you were trying not to pass gas. Or squeeze your muscles as if you were stopping the flow of urine. Your belly, legs, and buttocks shouldn't move.  Hold the squeeze for 3 seconds, then relax for 5 to 10 seconds.  Start with 3 seconds, then add 1 second each week until you are able to squeeze for 10 seconds.  Repeat the exercise 10 times a session. Do 3 to 8 sessions a day.  When should you call for help?  Watch closely for changes in your health, and be sure to contact your doctor if:    Your incontinence is getting worse.     You do not get better as expected.   Where can you learn more?  Go to https://www.[employer].net/patiented  Enter V684 in the search box to learn more about \"Bladder Training: Care Instructions.\"  Current as of: February 28, 2023               Content Version: 13.8    3686-1983 [employer].   Care instructions adapted under license by your healthcare professional. If you have questions about a medical condition or this instruction, always ask your healthcare professional. [employer] disclaims any warranty or liability for your use of this information.         "

## 2024-01-22 NOTE — PROGRESS NOTES
"Preventive Care Visit  [facility] JIGNA Leon MD, Internal Medicine - Pediatrics  Jan 22, 2024      SUBJECTIVE:   Davida is a 73 year old, presenting for the following:  Medicare Visit        1/22/2024     6:51 AM   Additional Questions   Roomed by Lian         1/22/2024     6:51 AM   Patient Reported Additional Medications   Patient reports taking the following new medications none     Are you in the first 12 months of your Medicare coverage?  No    Healthy Habits:     In general, how would you rate your overall health?  Good    Frequency of exercise:  2-3 days/week    Duration of exercise:  15-30 minutes    Do you usually eat at least 4 servings of fruit and vegetables a day, include whole grains    & fiber and avoid regularly eating high fat or \"junk\" foods?  Yes    Taking medications regularly:  Yes    Medication side effects:  None    Ability to successfully perform activities of daily living:  No assistance needed    Home Safety:  No safety concerns identified    Hearing Impairment:  No hearing concerns    In the past 6 months, have you been bothered by leaking of urine? Yes    In general, how would you rate your overall mental or emotional health?  Good    Additional concerns today:  No      Today's PHQ-2 Score:       1/22/2024     6:44 AM   PHQ-2 ( 1999 [employer])   Q1: Little interest or pleasure in doing things 0   Q2: Feeling down, depressed or hopeless 0   PHQ-2 Score 0   Q1: Little interest or pleasure in doing things Not at all   Q2: Feeling down, depressed or hopeless Not at all   PHQ-2 Score 0     Here for AWV.    HTN. No cardiac sx such as CP, palpitations, PND, orthopnea, NGO or peripheral edema.  BP Readings from Last 3 Encounters:   01/22/24 124/66   07/17/23 120/72   01/16/23 122/68     CKD. Reviewed past creatinine levels.  Lab Results   Component Value Date    CR 1.11 01/22/2024    CR 1.22 01/16/2023    CR 1.14 01/13/2021    CR 1.04 01/06/2020     GFR Estimate   Date Value " Ref Range Status   01/22/2024 53 (L) >60 mL/min/1.73m2 Final   01/13/2021 49 (L) >60 mL/min/[1.73_m2] Final     No active renal sx at this time    Class 2 Obesity with comorbidity. BMI is between 35-40. She has a dx of HTN Discussed that further improvement of her weight would likely improve the medical condition.         Via the Health Maintenance questionnaire, the patient has reported the following services have been completed -Mammogram, this information has been sent to the abstraction team.  Have you ever done Advance Care Planning? (For example, a Health Directive, POLST, or a discussion with a medical provider or your loved ones about your wishes): Yes, advance care planning is on file.       Fall risk  Fallen 2 or more times in the past year?: No  Any fall with injury in the past year?: No    Cognitive Screening   1) Repeat 3 items (Leader, Season, Table)    2) Clock draw: NORMAL  3) 3 item recall: Recalls 3 objects  Results: 3 items recalled: COGNITIVE IMPAIRMENT LESS LIKELY    Mini-CogTM Copyright S Lenora. Licensed by the author for use in [facility]; reprinted with permission (josi@[facility]). All rights reserved.         Social History     Tobacco Use    Smoking status: Never    Smokeless tobacco: Never   Substance Use Topics    Alcohol use: Yes     Comment: 1 to 2 per month           1/15/2024     8:56 AM   Alcohol Use   Prescreen: >3 drinks/day or >7 drinks/week? No          No data to display              Do you have a current opioid prescription? No  Do you use any other controlled substances or medications that are not prescribed by a provider? None      Current providers sharing in care for this patient include:   Patient Care Team:  Hugh Leon MD as PCP - General  Hugh Leon MD as Assigned PCP    The following health maintenance items are reviewed in Epic and correct as of today:  Health Maintenance   Topic Date Due    RSV VACCINE (Pregnancy & 60+) (1 - 1-dose 60+ series)  "Never done    MEDICARE ANNUAL WELLNESS VISIT  01/16/2024    MAMMO SCREENING  02/02/2024    ANNUAL REVIEW OF HM ORDERS  07/17/2024    FALL RISK ASSESSMENT  01/22/2025    COLORECTAL CANCER SCREENING  01/31/2025    DEXA  01/19/2026    DTAP/TDAP/TD IMMUNIZATION (4 - Td or Tdap) 08/07/2027    LIPID  01/16/2028    ADVANCE CARE PLANNING  01/22/2029    PHQ-2 (once per calendar year)  Completed    INFLUENZA VACCINE  Completed    Pneumococcal Vaccine: 65+ Years  Completed    ZOSTER IMMUNIZATION  Completed    COVID-19 Vaccine  Completed    HEPATITIS C SCREENING  Addressed    IPV IMMUNIZATION  Aged Out    HPV IMMUNIZATION  Aged Out    MENINGITIS IMMUNIZATION  Aged Out    RSV MONOCLONAL ANTIBODY  Aged Out           Review of Systems   Constitutional:  Negative for chills and fever.   HENT:  Negative for congestion, ear pain, hearing loss and sore throat.    Eyes:  Negative for pain and visual disturbance.   Respiratory:  Negative for cough and shortness of breath.    Cardiovascular:  Negative for chest pain and palpitations.   Gastrointestinal:  Negative for abdominal pain, constipation, diarrhea and nausea.   Genitourinary:  Negative for dysuria, frequency, genital sores, hematuria, pelvic pain, urgency, vaginal bleeding and vaginal discharge.   Musculoskeletal:  Negative for arthralgias, joint swelling and myalgias.   Skin:  Negative for rash.   Neurological:  Negative for dizziness, weakness and headaches.   Psychiatric/Behavioral:  The patient is not nervous/anxious.         OBJECTIVE:   /66 (BP Location: Right arm, Patient Position: Sitting, Cuff Size: Adult Regular)   Pulse 70   Temp 97.1  F (36.2  C) (Tympanic)   Resp 18   Ht 1.562 m (5' 1.5\")   Wt 88.6 kg (195 lb 4.8 oz)   LMP  (LMP Unknown)   SpO2 97%   BMI 36.30 kg/m     Estimated body mass index is 36.3 kg/m  as calculated from the following:    Height as of this encounter: 1.562 m (5' 1.5\").    Weight as of this encounter: 88.6 kg (195 lb 4.8 " "oz).  Physical Exam  GENERAL: healthy, alert and no distress  EYES: PERRL, EOMI  HENT: ear canals and TM's normal. No nasal discharge. OP moist.  NECK: no adenopathy  RESP: lungs clear to auscultation - no rales, rhonchi or wheezes  CV: regular rate and rhythm, normal S1 S2, 2/6 systolic murmur, no peripheral edema and peripheral pulses strong  ABDOMEN: soft, nontender, bowel sounds normal  MS: no gross musculoskeletal defects noted  SKIN: no suspicious lesions or rashes  NEURO: Normal strength and tone  PSYCH: mentation appears normal, affect normal/bright       ASSESSMENT / PLAN:       ICD-10-CM    1. Routine general medical examination at a health care facility  Z00.00 Basic metabolic panel  (Ca, Cl, CO2, Creat, Gluc, K, Na, BUN)     Basic metabolic panel  (Ca, Cl, CO2, Creat, Gluc, K, Na, BUN)      2. Essential hypertension, benign  I10 lisinopril (ZESTRIL) 5 MG tablet     Basic metabolic panel  (Ca, Cl, CO2, Creat, Gluc, K, Na, BUN)     Basic metabolic panel  (Ca, Cl, CO2, Creat, Gluc, K, Na, BUN)      3. Stage 3a chronic kidney disease (H)  N18.31 Basic metabolic panel  (Ca, Cl, CO2, Creat, Gluc, K, Na, BUN)     Basic metabolic panel  (Ca, Cl, CO2, Creat, Gluc, K, Na, BUN)      4. Class 2 obesity with serious comorbidity (H)  E66.01     Z68.36          Overall doing well.  HTN - BP is controlled. CPM.  CKD. Creat is improved. No changes today.  Obesity - she will continue to work toward weight loss.   Hx of aortic sclerosis on echo in 2016. Consider repeating echo at the time of her next routine visit, sooner if sx develop.     Counseling  Reviewed preventive health counseling, as reflected in patient instructions      BMI  Estimated body mass index is 36.3 kg/m  as calculated from the following:    Height as of this encounter: 1.562 m (5' 1.5\").    Weight as of this encounter: 88.6 kg (195 lb 4.8 oz).   Weight management plan: Discussed healthy diet and exercise guidelines      She reports that she has never " smoked. She has never used smokeless tobacco.      Appropriate preventive services were discussed with this patient, including applicable screening as appropriate for fall prevention, nutrition, physical activity, Tobacco-use cessation, weight loss and cognition.  Checklist reviewing preventive services available has been given to the patient.    Reviewed patients plan of care and provided an AVS. The Basic Care Plan (routine screening as documented in Health Maintenance) for Lilliana meets the Care Plan requirement. This Care Plan has been established and reviewed with the Patient.    Signed Electronically by: Hugh Leon MD    Information on urinary incontinence and treatment options given to patient.

## 2024-02-05 ENCOUNTER — ANCILLARY PROCEDURE (OUTPATIENT)
Dept: MAMMOGRAPHY | Facility: CLINIC | Age: 74
End: 2024-02-05
Attending: INTERNAL MEDICINE
Payer: COMMERCIAL

## 2024-02-05 DIAGNOSIS — Z12.31 VISIT FOR SCREENING MAMMOGRAM: ICD-10-CM

## 2024-02-05 PROCEDURE — 77067 SCR MAMMO BI INCL CAD: CPT | Mod: TC | Performed by: RADIOLOGY

## 2024-04-24 ENCOUNTER — TRANSFERRED RECORDS (OUTPATIENT)
Dept: HEALTH INFORMATION MANAGEMENT | Facility: CLINIC | Age: 74
End: 2024-04-24
Payer: COMMERCIAL

## 2024-05-18 ENCOUNTER — OFFICE VISIT (OUTPATIENT)
Dept: URGENT CARE | Facility: URGENT CARE | Age: 74
End: 2024-05-18
Payer: COMMERCIAL

## 2024-05-18 VITALS
SYSTOLIC BLOOD PRESSURE: 128 MMHG | DIASTOLIC BLOOD PRESSURE: 61 MMHG | TEMPERATURE: 97.5 F | HEART RATE: 71 BPM | OXYGEN SATURATION: 99 %

## 2024-05-18 DIAGNOSIS — R19.5 LOOSE STOOLS: ICD-10-CM

## 2024-05-18 DIAGNOSIS — R10.9 ABDOMINAL CRAMPING: Primary | ICD-10-CM

## 2024-05-18 LAB
BASOPHILS # BLD AUTO: 0 10E3/UL (ref 0–0.2)
BASOPHILS NFR BLD AUTO: 0 %
EOSINOPHIL # BLD AUTO: 0.1 10E3/UL (ref 0–0.7)
EOSINOPHIL NFR BLD AUTO: 1 %
ERYTHROCYTE [DISTWIDTH] IN BLOOD BY AUTOMATED COUNT: 13.5 % (ref 10–15)
HCT VFR BLD AUTO: 37.3 % (ref 35–47)
HGB BLD-MCNC: 12.3 G/DL (ref 11.7–15.7)
IMM GRANULOCYTES # BLD: 0 10E3/UL
IMM GRANULOCYTES NFR BLD: 0 %
LYMPHOCYTES # BLD AUTO: 1.2 10E3/UL (ref 0.8–5.3)
LYMPHOCYTES NFR BLD AUTO: 11 %
MCH RBC QN AUTO: 31.7 PG (ref 26.5–33)
MCHC RBC AUTO-ENTMCNC: 33 G/DL (ref 31.5–36.5)
MCV RBC AUTO: 96 FL (ref 78–100)
MONOCYTES # BLD AUTO: 0.5 10E3/UL (ref 0–1.3)
MONOCYTES NFR BLD AUTO: 5 %
NEUTROPHILS # BLD AUTO: 8.7 10E3/UL (ref 1.6–8.3)
NEUTROPHILS NFR BLD AUTO: 83 %
PLATELET # BLD AUTO: 228 10E3/UL (ref 150–450)
RBC # BLD AUTO: 3.88 10E6/UL (ref 3.8–5.2)
WBC # BLD AUTO: 10.4 10E3/UL (ref 4–11)

## 2024-05-18 PROCEDURE — 36415 COLL VENOUS BLD VENIPUNCTURE: CPT | Performed by: PHYSICIAN ASSISTANT

## 2024-05-18 PROCEDURE — 85025 COMPLETE CBC W/AUTO DIFF WBC: CPT | Performed by: PHYSICIAN ASSISTANT

## 2024-05-18 PROCEDURE — 99213 OFFICE O/P EST LOW 20 MIN: CPT | Performed by: PHYSICIAN ASSISTANT

## 2024-05-18 NOTE — PROGRESS NOTES
"  ASSESSMENT/PLAN:    (R10.9) Abdominal cramping  (primary encounter diagnosis)    MDM: Transient, cramping across entire lower abdomen (lasting approximately 2 hours duration)-and associated with multiple nonbloody bowel movements.  No active abdominal pain now.  No fever or other acute illness symptoms.  No UTI or GYN symptoms. Normal, very reassuring abdominal exam here today.  CBC with differential is unremarkable. Plan as per below.     Plan: CBC with Platelets & Differential          May 18, 2024 Urgent Care Plan:     -Liquid diet for remainder of day today.   -Ok to move to full diet tomorrow as long as no return of abdominal, diarrhea or other gastrointestinal symptoms  -We reviewed classic appendicitis symptoms today and other emergency signs and symptoms that would require emergency room evaluation     (R19.5) Loose stools  Plan: CBC with Platelets & Differential          This progress note has been dictated, with use of voice recognition software. Any grammatical, typographical, or context errors are unintentional and inherent to use of voice recognition software.  -----------      Chief Complaint   Patient presents with    Urgent Care     Diarrhea, hot flash, lower abdominal pain. Had for about 2 hours and is feeling better. Concerned about appendix           SUBJECTIVE:     Lilliana Manning  presents to clinic today for evaluation of acute onset, transient, crampy pain across her entire lower abdomen--followed by 1 formed bowel movement and 3 loose nonbloody bowel movements.  Patient states her pain lasted approximately 11 AM to 1 PM today (and subsequently spontaneously resolved).  No abdominal pain now.  Patient tells me her sister had an atypical presentation of appendicitis in her 60's and wonders if her symptoms earlier today could be appendicitis. No RLQ pain. No pain around belly button.     Associated symptoms: Patient reports transient nausea and sensation of a \"hot flash\" associated with the " above.      ROS:Positive as per above.  No abdominal pain now. No acute dysuria, urinary urgency/frequency or hematuria. No gyn symptoms. No acute back/flank pain. . No associated fever, chills, shortness of breath, nausea, vomiting,  body aches, headaches, rashes, joint swelling or other acute illness symptoms.     GI HX: S/p cholecystectomy in her 40s. No history of diverticulitis       Past Medical History:   Diagnosis Date    Hypertension     Intestinal disaccharidase deficiencies and disaccharide malabsorption     Preglaucoma, unspecified        Patient Active Problem List   Diagnosis    Essential hypertension, benign    Class 2 obesity with serious comorbidity (H)    Systolic murmur    Stage 3a chronic kidney disease (H)       Current Outpatient Medications   Medication Sig Dispense Refill    calcium-vitamin D (CALTRATE) 600-400 MG-UNIT per tablet Take 1 tablet by mouth 2 times daily      Cholecalciferol (VITAMIN D3) 2000 units CAPS       co-enzyme Q-10 100 MG CAPS capsule Take by mouth daily      cyanocobalamin (VITAMIN B-12) 500 MCG tablet Take 1 tablet (500 mcg) by mouth daily      Lactobacillus (PROBIOTIC ACIDOPHILUS PO)       lisinopril (ZESTRIL) 5 MG tablet Take 1 tablet (5 mg) by mouth daily 90 tablet 4    multivitamin w/minerals (THERA-VIT-M) tablet Take 1 tablet by mouth daily 100 tablet 3    vitamin C (ASCORBIC ACID) 1000 MG TABS Take 1,000 mg by mouth daily       No current facility-administered medications for this visit.       No Known Allergies          OBJECTIVE:   /61 (BP Location: Right arm, Patient Position: Sitting, Cuff Size: Adult Regular)   Pulse 71   Temp 97.5  F (36.4  C) (Tympanic)   LMP  (LMP Unknown)   SpO2 99%   Breastfeeding No       GENERAL:  Appears comfortable. NAD  HEENT:   Conjunctiva without infection.  Sclera clear/non-icteric  NECK: Trachea is midline. Neck is supple, FROM with no adenopathy  CARDIAC:NORMAL - regular rate and rhythm without murmur., normal s1/s2  and without extra heart sounds  RESP: No increased work of breathing at rest. Able to speak full sentences without pause. Normal - CTA without rales, rhonchi, or wheezing.  ABDOMEN:  Soft, non-tender, non-distended.  Positive normal bowel sounds.  No HSM or masses.  No suprapubic tenderness.  No CVA tenderness.  SKIN: No rashes.  Normal color.  Sclera clear.    LAB:   Results for orders placed or performed in visit on 05/18/24   CBC with platelets and differential     Status: Abnormal   Result Value Ref Range    WBC Count 10.4 4.0 - 11.0 10e3/uL    RBC Count 3.88 3.80 - 5.20 10e6/uL    Hemoglobin 12.3 11.7 - 15.7 g/dL    Hematocrit 37.3 35.0 - 47.0 %    MCV 96 78 - 100 fL    MCH 31.7 26.5 - 33.0 pg    MCHC 33.0 31.5 - 36.5 g/dL    RDW 13.5 10.0 - 15.0 %    Platelet Count 228 150 - 450 10e3/uL    % Neutrophils 83 %    % Lymphocytes 11 %    % Monocytes 5 %    % Eosinophils 1 %    % Basophils 0 %    % Immature Granulocytes 0 %    Absolute Neutrophils 8.7 (H) 1.6 - 8.3 10e3/uL    Absolute Lymphocytes 1.2 0.8 - 5.3 10e3/uL    Absolute Monocytes 0.5 0.0 - 1.3 10e3/uL    Absolute Eosinophils 0.1 0.0 - 0.7 10e3/uL    Absolute Basophils 0.0 0.0 - 0.2 10e3/uL    Absolute Immature Granulocytes 0.0 <=0.4 10e3/uL   CBC with Platelets & Differential     Status: Abnormal    Narrative    The following orders were created for panel order CBC with Platelets & Differential.  Procedure                               Abnormality         Status                     ---------                               -----------         ------                     CBC with platelets and d...[[id number]]  Abnormal            Final result                 Please view results for these tests on the individual orders.

## 2024-05-19 NOTE — PATIENT INSTRUCTIONS
May 18, 2024 Urgent Care Plan:     -Liquid diet for remainder of day today.   -Ok to move to full diet tomorrow as long as no return of abdominal, diarrhea or other gastrointestinal symptoms  -We reviewed classic appendicitis symptoms today and other emergency signs and symptoms that would require emergency room evaluation

## 2024-09-03 ENCOUNTER — OFFICE VISIT (OUTPATIENT)
Dept: DERMATOLOGY | Facility: CLINIC | Age: 74
End: 2024-09-03
Payer: COMMERCIAL

## 2024-09-03 DIAGNOSIS — L28.0 LICHEN SIMPLEX CHRONICUS: Primary | ICD-10-CM

## 2024-09-03 DIAGNOSIS — D17.22 LIPOMA OF LEFT UPPER EXTREMITY: ICD-10-CM

## 2024-09-03 DIAGNOSIS — D49.2 NEOPLASM OF UNSPECIFIED BEHAVIOR OF BONE, SOFT TISSUE, AND SKIN: ICD-10-CM

## 2024-09-03 PROCEDURE — 99204 OFFICE O/P NEW MOD 45 MIN: CPT | Mod: 25 | Performed by: PHYSICIAN ASSISTANT

## 2024-09-03 PROCEDURE — 88305 TISSUE EXAM BY PATHOLOGIST: CPT | Mod: 26 | Performed by: PATHOLOGY

## 2024-09-03 PROCEDURE — 88305 TISSUE EXAM BY PATHOLOGIST: CPT | Mod: TC | Performed by: PHYSICIAN ASSISTANT

## 2024-09-03 PROCEDURE — 11102 TANGNTL BX SKIN SINGLE LES: CPT | Performed by: PHYSICIAN ASSISTANT

## 2024-09-03 RX ORDER — TRIAMCINOLONE ACETONIDE 1 MG/G
OINTMENT TOPICAL
Qty: 30 G | Refills: 4 | Status: SHIPPED | OUTPATIENT
Start: 2024-09-03

## 2024-09-03 ASSESSMENT — PAIN SCALES - GENERAL: PAINLEVEL: NO PAIN (0)

## 2024-09-03 NOTE — LETTER
9/3/2024       RE: Lilliana Manning  [address] [place] [address]     Dear Colleague,    Thank you for referring your patient, Lilliana Manning, to the [facility] [place] at [facility]. Please see a copy of my visit note below.    [facility] Dermatology Note  Encounter Date: Sep 3, 2024  Office Visit     Reviewed patients past medical history and pertinent chart review prior to patients visit today.     Dermatology Problem List:  # NUB, submental chin, shave biopsy 9/3/2024   # Lipoma  # Lichen simplex chronicus  - triamcinolone ointment     ____________________________________________    Assessment & Plan:     # Neoplasm of uncertain behavior:  submental chin  DDx includes ISK vs verruca vulgaris vs SCC. Shave biopsy today.    Procedure Note: Biopsy by shave technique  The risks and benefits of the procedure were described to the patient. These include but are not limited to bleeding, infection, scar, incomplete removal, and non-diagnostic biopsy. Verbal informed consent was obtained. The above site(s) was cleansed with an alcohol pad and injected with 1% lidocaine with epinephrine. Once anesthesia was obtained, a biopsy(ies) was performed with Gilette blade. The tissue(s) was placed in a labeled container(s) with formalin and sent to pathology. Hemostasis was achieved with aluminum chloride. Vaseline and a bandage were applied to the wound(s). The patient tolerated the procedure well and was given post biopsy care instructions.    # Lichen simplex chronicus  -We discussed the nature of this skin condition and I recommended the patient avoid picking is much as possible.  - triamcinolone 0.1% ointment twice daily for 2 weeks. Restart if rash flares again in the future. We reviewed potential side effects, including skin atrophy. Thereafter, start OTC Amlactin lotion daily.     # Lipoma  - We discussed the benign  nature of the skin lesions. No treatment is required. I recommend continued observation with follow up should any concerning changes arise. The patient reports she may consider excision in the future, declined for now.     Follow up as needed.    All risks, benefits and alternatives were discussed with patient.  Patient is in agreement and understands the assessment and plan.  All questions were answered.  Beba Mayer PA-C  [facility] Dermatology  _______________________________________    CC: Derm Problem (Daivda is here today for a lesion of concern on her chin. )    HPI:  Ms. Lilliana Manning is a(n) 74 year old female who presents today as a new patient for a lesion on her chin. This started about 2 months. No pain, itching, or bleeding at the site. Second, she notes a dry patch on her right elbow that has been present for years. She used to rest on this elbow while working, and occasionally scratches the sire. No pain, itching, or bleeding at the site. Finally, she notes a soft bump on her left shoulder. This has been present for many years without change. Patient is otherwise feeling well, without additional skin concerns.    Physical Exam:  SKIN: Focused examination of chin was performed.  - Involving the submental chin is a 0.4 x 0.4 cm pink papule with adherent scale.   - The left elbow demonstrates a hypertrophic patch.   - The left posterior shoulder demonstrates a 7 x 9 cm well demarcated, smooth, mobile subcutaneous nodule, consistent with a lipoma.     - No other lesions of concern on areas examined.     Medications:  Current Outpatient Medications   Medication Sig Dispense Refill     calcium-vitamin D (CALTRATE) 600-400 MG-UNIT per tablet Take 1 tablet by mouth 2 times daily       Cholecalciferol (VITAMIN D3) 2000 units CAPS        co-enzyme Q-10 100 MG CAPS capsule Take by mouth daily       cyanocobalamin (VITAMIN B-12) 500 MCG tablet Take 1 tablet (500 mcg) by mouth daily       Lactobacillus  (PROBIOTIC ACIDOPHILUS PO)        lisinopril (ZESTRIL) 5 MG tablet Take 1 tablet (5 mg) by mouth daily 90 tablet 4     multivitamin w/minerals (THERA-VIT-M) tablet Take 1 tablet by mouth daily 100 tablet 3     vitamin C (ASCORBIC ACID) 1000 MG TABS Take 1,000 mg by mouth daily       No current facility-administered medications for this visit.      Past Medical History:   Patient Active Problem List   Diagnosis     Essential hypertension, benign     Class 2 obesity with serious comorbidity (H)     Systolic murmur     Stage 3a chronic kidney disease (H)     Past Medical History:   Diagnosis Date     Hypertension      Intestinal disaccharidase deficiencies and disaccharide malabsorption      Preglaucoma, unspecified        CC Referred Self, MD  No address on file on close of this encounter.       Again, thank you for allowing me to participate in the care of your patient.      Sincerely,    Beba Mayer PA-C

## 2024-09-03 NOTE — PROGRESS NOTES
[facility] Dermatology Note  Encounter Date: Sep 3, 2024  Office Visit     Reviewed patients past medical history and pertinent chart review prior to patients visit today.     Dermatology Problem List:  # NUB, submental chin, shave biopsy 9/3/2024   # Lipoma  # Lichen simplex chronicus  - triamcinolone ointment     ____________________________________________    Assessment & Plan:     # Neoplasm of uncertain behavior:  submental chin  DDx includes ISK vs verruca vulgaris vs SCC. Shave biopsy today.    Procedure Note: Biopsy by shave technique  The risks and benefits of the procedure were described to the patient. These include but are not limited to bleeding, infection, scar, incomplete removal, and non-diagnostic biopsy. Verbal informed consent was obtained. The above site(s) was cleansed with an alcohol pad and injected with 1% lidocaine with epinephrine. Once anesthesia was obtained, a biopsy(ies) was performed with Gilette blade. The tissue(s) was placed in a labeled container(s) with formalin and sent to pathology. Hemostasis was achieved with aluminum chloride. Vaseline and a bandage were applied to the wound(s). The patient tolerated the procedure well and was given post biopsy care instructions.    # Lichen simplex chronicus  -We discussed the nature of this skin condition and I recommended the patient avoid picking is much as possible.  - triamcinolone 0.1% ointment twice daily for 2 weeks. Restart if rash flares again in the future. We reviewed potential side effects, including skin atrophy. Thereafter, start OTC Amlactin lotion daily.     # Lipoma  - We discussed the benign nature of the skin lesions. No treatment is required. I recommend continued observation with follow up should any concerning changes arise. The patient reports she may consider excision in the future, declined for now.     Follow up as needed.    All risks, benefits and alternatives were discussed with  patient.  Patient is in agreement and understands the assessment and plan.  All questions were answered.  KIKO Wheeler [facility] Dermatology  _______________________________________    CC: Derm Problem (Davida is here today for a lesion of concern on her chin. )    HPI:  Ms. Lilliana Manning is a(n) 74 year old female who presents today as a new patient for a lesion on her chin. This started about 2 months. No pain, itching, or bleeding at the site. Second, she notes a dry patch on her right elbow that has been present for years. She used to rest on this elbow while working, and occasionally scratches the sire. No pain, itching, or bleeding at the site. Finally, she notes a soft bump on her left shoulder. This has been present for many years without change. Patient is otherwise feeling well, without additional skin concerns.    Physical Exam:  SKIN: Focused examination of chin was performed.  - Involving the submental chin is a 0.4 x 0.4 cm pink papule with adherent scale.   - The left elbow demonstrates a hypertrophic patch.   - The left posterior shoulder demonstrates a 7 x 9 cm well demarcated, smooth, mobile subcutaneous nodule, consistent with a lipoma.     - No other lesions of concern on areas examined.     Medications:  Current Outpatient Medications   Medication Sig Dispense Refill    calcium-vitamin D (CALTRATE) 600-400 MG-UNIT per tablet Take 1 tablet by mouth 2 times daily      Cholecalciferol (VITAMIN D3) 2000 units CAPS       co-enzyme Q-10 100 MG CAPS capsule Take by mouth daily      cyanocobalamin (VITAMIN B-12) 500 MCG tablet Take 1 tablet (500 mcg) by mouth daily      Lactobacillus (PROBIOTIC ACIDOPHILUS PO)       lisinopril (ZESTRIL) 5 MG tablet Take 1 tablet (5 mg) by mouth daily 90 tablet 4    multivitamin w/minerals (THERA-VIT-M) tablet Take 1 tablet by mouth daily 100 tablet 3    vitamin C (ASCORBIC ACID) 1000 MG TABS Take 1,000 mg by mouth daily       No current  facility-administered medications for this visit.      Past Medical History:   Patient Active Problem List   Diagnosis    Essential hypertension, benign    Class 2 obesity with serious comorbidity (H)    Systolic murmur    Stage 3a chronic kidney disease (H)     Past Medical History:   Diagnosis Date    Hypertension     Intestinal disaccharidase deficiencies and disaccharide malabsorption     Preglaucoma, unspecified        CC Referred Self, MD  No address on file on close of this encounter.

## 2024-09-03 NOTE — NURSING NOTE
Dermatology Rooming Note    Lilliana Manning's goals for this visit include:   Chief Complaint   Patient presents with    Derm Problem     Davida is here today for a lesion of concern on her chin.      Kedar VANEGAS CMA

## 2024-09-03 NOTE — NURSING NOTE
Lidocaine-epinephrine 1-1:080431 % injection   1mL once for one use, starting 9/3/2024 ending 9/3/2024,  2mL disp, R-0, injection  Injected by Kedar VANEGAS CMA

## 2024-09-04 LAB
PATH REPORT.COMMENTS IMP SPEC: NORMAL
PATH REPORT.FINAL DX SPEC: NORMAL
PATH REPORT.GROSS SPEC: NORMAL
PATH REPORT.MICROSCOPIC SPEC OTHER STN: NORMAL
PATH REPORT.RELEVANT HX SPEC: NORMAL

## 2025-01-08 ENCOUNTER — PATIENT OUTREACH (OUTPATIENT)
Dept: CARE COORDINATION | Facility: CLINIC | Age: 75
End: 2025-01-08
Payer: COMMERCIAL

## 2025-01-08 DIAGNOSIS — Z12.11 COLON CANCER SCREENING: ICD-10-CM

## 2025-01-22 ENCOUNTER — ORDERS ONLY (AUTO-RELEASED) (OUTPATIENT)
Dept: ADMISSION | Facility: CLINIC | Age: 75
End: 2025-01-22
Payer: COMMERCIAL

## 2025-01-22 DIAGNOSIS — Z12.11 COLON CANCER SCREENING: ICD-10-CM

## 2025-02-01 ENCOUNTER — OFFICE VISIT (OUTPATIENT)
Dept: URGENT CARE | Facility: URGENT CARE | Age: 75
End: 2025-02-01
Payer: COMMERCIAL

## 2025-02-01 VITALS
HEART RATE: 65 BPM | BODY MASS INDEX: 42.87 KG/M2 | TEMPERATURE: 98 F | OXYGEN SATURATION: 97 % | DIASTOLIC BLOOD PRESSURE: 62 MMHG | WEIGHT: 230 LBS | SYSTOLIC BLOOD PRESSURE: 186 MMHG

## 2025-02-01 DIAGNOSIS — J10.1 INFLUENZA A: Primary | ICD-10-CM

## 2025-02-01 DIAGNOSIS — J10.1 INFLUENZA A: ICD-10-CM

## 2025-02-01 DIAGNOSIS — R11.10 VOMITING, UNSPECIFIED VOMITING TYPE, UNSPECIFIED WHETHER NAUSEA PRESENT: ICD-10-CM

## 2025-02-01 LAB
DEPRECATED S PYO AG THROAT QL EIA: NEGATIVE
FLUAV AG SPEC QL IA: POSITIVE
FLUBV AG SPEC QL IA: NEGATIVE
S PYO DNA THROAT QL NAA+PROBE: NOT DETECTED

## 2025-02-01 PROCEDURE — 87651 STREP A DNA AMP PROBE: CPT | Performed by: FAMILY MEDICINE

## 2025-02-01 PROCEDURE — 87804 INFLUENZA ASSAY W/OPTIC: CPT | Performed by: FAMILY MEDICINE

## 2025-02-01 PROCEDURE — 87635 SARS-COV-2 COVID-19 AMP PRB: CPT | Performed by: FAMILY MEDICINE

## 2025-02-01 PROCEDURE — 99214 OFFICE O/P EST MOD 30 MIN: CPT | Performed by: FAMILY MEDICINE

## 2025-02-01 RX ORDER — OSELTAMIVIR PHOSPHATE 30 MG/1
30 CAPSULE ORAL 2 TIMES DAILY
Qty: 8 CAPSULE | Refills: 0 | Status: SHIPPED | OUTPATIENT
Start: 2025-02-01 | End: 2025-02-05

## 2025-02-01 RX ORDER — OMEGA-3 FATTY ACIDS/FISH OIL 300-1000MG
CAPSULE ORAL
COMMUNITY
Start: 2024-07-18

## 2025-02-01 RX ORDER — OSELTAMIVIR PHOSPHATE 75 MG/1
75 CAPSULE ORAL ONCE
Qty: 1 CAPSULE | Refills: 0 | OUTPATIENT
Start: 2025-02-01 | End: 2025-02-01

## 2025-02-01 RX ORDER — ONDANSETRON 4 MG/1
4 TABLET, ORALLY DISINTEGRATING ORAL EVERY 8 HOURS PRN
Qty: 12 TABLET | Refills: 0 | Status: CANCELLED | OUTPATIENT
Start: 2025-02-01

## 2025-02-01 RX ORDER — OSELTAMIVIR PHOSPHATE 75 MG/1
75 CAPSULE ORAL ONCE
Qty: 1 CAPSULE | Refills: 0 | Status: SHIPPED | OUTPATIENT
Start: 2025-02-01 | End: 2025-02-01

## 2025-02-01 NOTE — PROGRESS NOTES
"SUBJECTIVE:   Lilliana Manning is a 74 year old female presenting with a chief complaint of headache, diarrhea, vomiting, chills.  Onset of symptoms was 2 day(s) ago.  Course of illness is worsening.    Severity moderate  Current and Associated symptoms: cugh, headache, diarrhea  Treatment measures tried include OTC Cough med, Fluids, and Rest.  Predisposing factors include None.    Threw up once after ate on Thursday  Diarrhea, watery and now \"jucy farts\".  No blood in stools.    Did obtain flu vaccination this season.    Past Medical History:   Diagnosis Date    Hypertension     Intestinal disaccharidase deficiencies and disaccharide malabsorption     Preglaucoma, unspecified      Current Outpatient Medications   Medication Sig Dispense Refill    calcium-vitamin D (CALTRATE) 600-400 MG-UNIT per tablet Take 1 tablet by mouth 2 times daily      Cholecalciferol (VITAMIN D3) 2000 units CAPS       cyanocobalamin (VITAMIN B-12) 500 MCG tablet Take 1 tablet (500 mcg) by mouth daily      Lactobacillus (PROBIOTIC ACIDOPHILUS PO)       lisinopril (ZESTRIL) 5 MG tablet Take 1 tablet (5 mg) by mouth daily. 90 tablet 4    multivitamin w/minerals (THERA-VIT-M) tablet Take 1 tablet by mouth daily 100 tablet 3    omega 3 1000 MG CAPS       vitamin C (ASCORBIC ACID) 1000 MG TABS Take 1,000 mg by mouth daily       Social History     Tobacco Use    Smoking status: Never    Smokeless tobacco: Never   Substance Use Topics    Alcohol use: Yes     Comment: 1 to 2 per month       ROS:  Review of systems negative except as stated above.    OBJECTIVE:  BP (!) 186/62   Pulse 65   Temp 98  F (36.7  C)   Wt 104.3 kg (230 lb)   LMP  (LMP Unknown)   SpO2 97%   BMI 42.87 kg/m    GENERAL APPEARANCE: healthy, alert and no distress  EYES: EOMI,  PERRL, conjunctiva clear  RESP: lungs with no audible wheezes or increase work of breathing  PSYCH: mentation appears normal and affect normal/bright    Results for orders placed or performed in visit " on 02/01/25   Influenza A & B Antigen - Clinic Collect     Status: Abnormal    Specimen: Nasopharyngeal; Swab   Result Value Ref Range    Influenza A antigen Positive (A) Negative    Influenza B antigen Negative Negative    Narrative    Test results must be correlated with clinical data. If necessary, results should be confirmed by a molecular assay or viral culture.   Streptococcus A Rapid Screen w/Reflex to PCR - Clinic Collect     Status: Normal    Specimen: Throat; Swab   Result Value Ref Range    Group A Strep antigen Negative Negative         ASSESSMENT/PLAN:  (J10.1) Influenza A  (primary encounter diagnosis)  Plan: oseltamivir (TAMIFLU) 75 MG capsule,         oseltamivir (TAMIFLU) 30 MG capsule            (R11.10) Vomiting, unspecified vomiting type, unspecified whether nausea present  Plan: Influenza A & B Antigen - Clinic Collect,         Streptococcus A Rapid Screen w/Reflex to PCR -         Clinic Collect, COVID-19 Virus (Coronavirus) by        PCR Nose, Group A Streptococcus PCR Throat Swab            Reassurance given, reviewed symptomatic treatment with tylenol, ibuprofen, plenty of fluids and rest.  RX Tamiflu given for treatment of influenza A - renal adjustment dosing, patient is within 48 hour window for treatment in high risk patient.  Will follow up on throat culture and treat if positive for strep.  COVID screen obtained as symptoms overlap with COVID infection, quarantine while awaiting result.    Follow up with primary provider if no improvement of symptoms in 1 week    Jarret Pugh MD  February 1, 2025 10:20 AM

## 2025-02-02 LAB — SARS-COV-2 RNA RESP QL NAA+PROBE: NEGATIVE

## 2025-02-07 ENCOUNTER — ANCILLARY PROCEDURE (OUTPATIENT)
Dept: MAMMOGRAPHY | Facility: CLINIC | Age: 75
End: 2025-02-07
Attending: INTERNAL MEDICINE
Payer: COMMERCIAL

## 2025-02-07 DIAGNOSIS — Z12.31 VISIT FOR SCREENING MAMMOGRAM: ICD-10-CM

## 2025-02-07 PROCEDURE — 77067 SCR MAMMO BI INCL CAD: CPT | Mod: TC | Performed by: RADIOLOGY

## 2025-02-07 PROCEDURE — 77063 BREAST TOMOSYNTHESIS BI: CPT | Mod: TC | Performed by: RADIOLOGY

## 2025-02-23 LAB — NONINV COLON CA DNA+OCC BLD SCRN STL QL: NEGATIVE

## 2025-03-22 ENCOUNTER — HEALTH MAINTENANCE LETTER (OUTPATIENT)
Age: 75
End: 2025-03-22

## 2025-05-29 ENCOUNTER — TRANSFERRED RECORDS (OUTPATIENT)
Dept: HEALTH INFORMATION MANAGEMENT | Facility: CLINIC | Age: 75
End: 2025-05-29
Payer: COMMERCIAL